# Patient Record
Sex: FEMALE | Race: WHITE | Employment: OTHER | URBAN - METROPOLITAN AREA
[De-identification: names, ages, dates, MRNs, and addresses within clinical notes are randomized per-mention and may not be internally consistent; named-entity substitution may affect disease eponyms.]

---

## 2020-04-04 ENCOUNTER — HOSPITAL ENCOUNTER (INPATIENT)
Age: 70
LOS: 4 days | Discharge: HOME | DRG: 481 | End: 2020-04-08
Attending: EMERGENCY MEDICINE | Admitting: INTERNAL MEDICINE
Payer: MEDICARE

## 2020-04-04 ENCOUNTER — APPOINTMENT (OUTPATIENT)
Dept: GENERAL RADIOLOGY | Age: 70
DRG: 481 | End: 2020-04-04
Attending: PHYSICIAN ASSISTANT
Payer: MEDICARE

## 2020-04-04 DIAGNOSIS — S20.222A BACK CONTUSION, LEFT, INITIAL ENCOUNTER: ICD-10-CM

## 2020-04-04 DIAGNOSIS — S72.002A CLOSED FRACTURE OF LEFT HIP, INITIAL ENCOUNTER (HCC): ICD-10-CM

## 2020-04-04 PROBLEM — E66.9 CLASS 1 OBESITY IN ADULT: Status: ACTIVE | Noted: 2020-04-04

## 2020-04-04 PROBLEM — E87.5 HYPERKALEMIA: Status: ACTIVE | Noted: 2020-04-04

## 2020-04-04 PROBLEM — R73.9 HYPERGLYCEMIA: Status: ACTIVE | Noted: 2020-04-04

## 2020-04-04 PROBLEM — S72.009A HIP FRACTURE (HCC): Status: ACTIVE | Noted: 2020-04-04

## 2020-04-04 PROBLEM — D72.829 LEUKOCYTOSIS: Status: ACTIVE | Noted: 2020-04-04

## 2020-04-04 PROBLEM — S72.92XA FEMUR FRACTURE, LEFT (HCC): Status: ACTIVE | Noted: 2020-04-04

## 2020-04-04 LAB
ALBUMIN SERPL-MCNC: 3.3 G/DL (ref 3.5–5)
ALBUMIN/GLOB SERPL: 0.9 {RATIO} (ref 1.1–2.2)
ALP SERPL-CCNC: 88 U/L (ref 45–117)
ALT SERPL-CCNC: 27 U/L (ref 12–78)
ANION GAP SERPL CALC-SCNC: 9 MMOL/L (ref 5–15)
AST SERPL-CCNC: 42 U/L (ref 15–37)
BASOPHILS # BLD: 0 K/UL (ref 0–0.1)
BASOPHILS NFR BLD: 0 % (ref 0–1)
BILIRUB SERPL-MCNC: 0.9 MG/DL (ref 0.2–1)
BUN SERPL-MCNC: 18 MG/DL (ref 6–20)
BUN/CREAT SERPL: 31 (ref 12–20)
CALCIUM SERPL-MCNC: 9.3 MG/DL (ref 8.5–10.1)
CHLORIDE SERPL-SCNC: 108 MMOL/L (ref 97–108)
CK SERPL-CCNC: 61 U/L (ref 26–192)
CO2 SERPL-SCNC: 17 MMOL/L (ref 21–32)
CREAT SERPL-MCNC: 0.58 MG/DL (ref 0.55–1.02)
DIFFERENTIAL METHOD BLD: ABNORMAL
EOSINOPHIL # BLD: 0 K/UL (ref 0–0.4)
EOSINOPHIL NFR BLD: 0 % (ref 0–7)
ERYTHROCYTE [DISTWIDTH] IN BLOOD BY AUTOMATED COUNT: 12.8 % (ref 11.5–14.5)
GLOBULIN SER CALC-MCNC: 3.5 G/DL (ref 2–4)
GLUCOSE BLD STRIP.AUTO-MCNC: 181 MG/DL (ref 65–100)
GLUCOSE BLD STRIP.AUTO-MCNC: 203 MG/DL (ref 65–100)
GLUCOSE SERPL-MCNC: 209 MG/DL (ref 65–100)
HCT VFR BLD AUTO: 40.6 % (ref 35–47)
HGB BLD-MCNC: 13.6 G/DL (ref 11.5–16)
IMM GRANULOCYTES # BLD AUTO: 0.1 K/UL (ref 0–0.04)
IMM GRANULOCYTES NFR BLD AUTO: 1 % (ref 0–0.5)
LIPASE SERPL-CCNC: <10 U/L (ref 73–393)
LYMPHOCYTES # BLD: 0.7 K/UL (ref 0.8–3.5)
LYMPHOCYTES NFR BLD: 5 % (ref 12–49)
MCH RBC QN AUTO: 31.1 PG (ref 26–34)
MCHC RBC AUTO-ENTMCNC: 33.5 G/DL (ref 30–36.5)
MCV RBC AUTO: 92.7 FL (ref 80–99)
MONOCYTES # BLD: 0.7 K/UL (ref 0–1)
MONOCYTES NFR BLD: 5 % (ref 5–13)
NEUTS SEG # BLD: 12.1 K/UL (ref 1.8–8)
NEUTS SEG NFR BLD: 89 % (ref 32–75)
NRBC # BLD: 0 K/UL (ref 0–0.01)
NRBC BLD-RTO: 0 PER 100 WBC
PLATELET # BLD AUTO: 147 K/UL (ref 150–400)
PMV BLD AUTO: 12.1 FL (ref 8.9–12.9)
POTASSIUM SERPL-SCNC: 3.3 MMOL/L (ref 3.5–5.1)
POTASSIUM SERPL-SCNC: 5.7 MMOL/L (ref 3.5–5.1)
PROT SERPL-MCNC: 6.8 G/DL (ref 6.4–8.2)
RBC # BLD AUTO: 4.38 M/UL (ref 3.8–5.2)
RBC MORPH BLD: ABNORMAL
SERVICE CMNT-IMP: ABNORMAL
SERVICE CMNT-IMP: ABNORMAL
SODIUM SERPL-SCNC: 134 MMOL/L (ref 136–145)
WBC # BLD AUTO: 13.6 K/UL (ref 3.6–11)

## 2020-04-04 PROCEDURE — 93005 ELECTROCARDIOGRAM TRACING: CPT

## 2020-04-04 PROCEDURE — 85025 COMPLETE CBC W/AUTO DIFF WBC: CPT

## 2020-04-04 PROCEDURE — 74011250636 HC RX REV CODE- 250/636: Performed by: INTERNAL MEDICINE

## 2020-04-04 PROCEDURE — 74011250636 HC RX REV CODE- 250/636: Performed by: PHYSICIAN ASSISTANT

## 2020-04-04 PROCEDURE — 96374 THER/PROPH/DIAG INJ IV PUSH: CPT

## 2020-04-04 PROCEDURE — 36415 COLL VENOUS BLD VENIPUNCTURE: CPT

## 2020-04-04 PROCEDURE — 73552 X-RAY EXAM OF FEMUR 2/>: CPT

## 2020-04-04 PROCEDURE — 71045 X-RAY EXAM CHEST 1 VIEW: CPT

## 2020-04-04 PROCEDURE — 99285 EMERGENCY DEPT VISIT HI MDM: CPT

## 2020-04-04 PROCEDURE — 65270000029 HC RM PRIVATE

## 2020-04-04 PROCEDURE — 82962 GLUCOSE BLOOD TEST: CPT

## 2020-04-04 PROCEDURE — 82550 ASSAY OF CK (CPK): CPT

## 2020-04-04 PROCEDURE — 73502 X-RAY EXAM HIP UNI 2-3 VIEWS: CPT

## 2020-04-04 PROCEDURE — 84132 ASSAY OF SERUM POTASSIUM: CPT

## 2020-04-04 PROCEDURE — 80053 COMPREHEN METABOLIC PANEL: CPT

## 2020-04-04 PROCEDURE — 83690 ASSAY OF LIPASE: CPT

## 2020-04-04 RX ORDER — HYDROMORPHONE HYDROCHLORIDE 2 MG/ML
1 INJECTION, SOLUTION INTRAMUSCULAR; INTRAVENOUS; SUBCUTANEOUS
Status: DISCONTINUED | OUTPATIENT
Start: 2020-04-04 | End: 2020-04-05

## 2020-04-04 RX ORDER — DEXTROSE MONOHYDRATE 100 MG/ML
0-250 INJECTION, SOLUTION INTRAVENOUS AS NEEDED
Status: DISCONTINUED | OUTPATIENT
Start: 2020-04-04 | End: 2020-04-08 | Stop reason: HOSPADM

## 2020-04-04 RX ORDER — HYDROMORPHONE HYDROCHLORIDE 1 MG/ML
0.5 INJECTION, SOLUTION INTRAMUSCULAR; INTRAVENOUS; SUBCUTANEOUS
Status: DISCONTINUED | OUTPATIENT
Start: 2020-04-04 | End: 2020-04-04

## 2020-04-04 RX ORDER — ACETAMINOPHEN 325 MG/1
650 TABLET ORAL
Status: DISCONTINUED | OUTPATIENT
Start: 2020-04-04 | End: 2020-04-08 | Stop reason: HOSPADM

## 2020-04-04 RX ORDER — CETIRIZINE HCL 10 MG
5 TABLET ORAL 2 TIMES DAILY
COMMUNITY

## 2020-04-04 RX ORDER — HEPARIN SODIUM 5000 [USP'U]/ML
5000 INJECTION, SOLUTION INTRAVENOUS; SUBCUTANEOUS ONCE
Status: DISCONTINUED | OUTPATIENT
Start: 2020-04-04 | End: 2020-04-04

## 2020-04-04 RX ORDER — OXYCODONE AND ACETAMINOPHEN 5; 325 MG/1; MG/1
1 TABLET ORAL
Status: DISCONTINUED | OUTPATIENT
Start: 2020-04-04 | End: 2020-04-07

## 2020-04-04 RX ORDER — ENOXAPARIN SODIUM 100 MG/ML
40 INJECTION SUBCUTANEOUS EVERY 24 HOURS
Status: DISCONTINUED | OUTPATIENT
Start: 2020-04-04 | End: 2020-04-08 | Stop reason: HOSPADM

## 2020-04-04 RX ORDER — INSULIN LISPRO 100 [IU]/ML
INJECTION, SOLUTION INTRAVENOUS; SUBCUTANEOUS
Status: DISCONTINUED | OUTPATIENT
Start: 2020-04-04 | End: 2020-04-06

## 2020-04-04 RX ORDER — SODIUM CHLORIDE 0.9 % (FLUSH) 0.9 %
5-40 SYRINGE (ML) INJECTION EVERY 8 HOURS
Status: DISCONTINUED | OUTPATIENT
Start: 2020-04-04 | End: 2020-04-08 | Stop reason: HOSPADM

## 2020-04-04 RX ORDER — ONDANSETRON 2 MG/ML
4 INJECTION INTRAMUSCULAR; INTRAVENOUS
Status: COMPLETED | OUTPATIENT
Start: 2020-04-04 | End: 2020-04-04

## 2020-04-04 RX ORDER — ASPIRIN 81 MG/1
81 TABLET ORAL DAILY
COMMUNITY
End: 2020-04-08

## 2020-04-04 RX ORDER — DOCUSATE SODIUM 100 MG/1
100 CAPSULE, LIQUID FILLED ORAL
COMMUNITY

## 2020-04-04 RX ORDER — MAGNESIUM SULFATE 100 %
4 CRYSTALS MISCELLANEOUS AS NEEDED
Status: DISCONTINUED | OUTPATIENT
Start: 2020-04-04 | End: 2020-04-08 | Stop reason: HOSPADM

## 2020-04-04 RX ORDER — SODIUM CHLORIDE 0.9 % (FLUSH) 0.9 %
5-40 SYRINGE (ML) INJECTION AS NEEDED
Status: DISCONTINUED | OUTPATIENT
Start: 2020-04-04 | End: 2020-04-08 | Stop reason: HOSPADM

## 2020-04-04 RX ORDER — SODIUM CHLORIDE 9 MG/ML
75 INJECTION, SOLUTION INTRAVENOUS CONTINUOUS
Status: DISCONTINUED | OUTPATIENT
Start: 2020-04-04 | End: 2020-04-05

## 2020-04-04 RX ORDER — PROCHLORPERAZINE EDISYLATE 5 MG/ML
10 INJECTION INTRAMUSCULAR; INTRAVENOUS
Status: DISCONTINUED | OUTPATIENT
Start: 2020-04-04 | End: 2020-04-08 | Stop reason: HOSPADM

## 2020-04-04 RX ADMIN — ONDANSETRON 4 MG: 2 INJECTION INTRAMUSCULAR; INTRAVENOUS at 17:42

## 2020-04-04 RX ADMIN — SODIUM CHLORIDE 75 ML/HR: 900 INJECTION, SOLUTION INTRAVENOUS at 19:40

## 2020-04-04 RX ADMIN — HYDROMORPHONE HYDROCHLORIDE 0.5 MG: 1 INJECTION, SOLUTION INTRAMUSCULAR; INTRAVENOUS; SUBCUTANEOUS at 19:41

## 2020-04-04 RX ADMIN — Medication 10 ML: at 21:03

## 2020-04-04 RX ADMIN — HYDROMORPHONE HYDROCHLORIDE 1 MG: 2 INJECTION, SOLUTION INTRAMUSCULAR; INTRAVENOUS; SUBCUTANEOUS at 23:07

## 2020-04-04 RX ADMIN — ENOXAPARIN SODIUM 40 MG: 40 INJECTION SUBCUTANEOUS at 21:02

## 2020-04-04 RX ADMIN — PROCHLORPERAZINE EDISYLATE 10 MG: 5 INJECTION INTRAMUSCULAR; INTRAVENOUS at 23:49

## 2020-04-04 NOTE — ED TRIAGE NOTES
Patient arrives EMS from home, had a GLF onto her left side. Reports left femur pain. Left foot noted to be shortened and rotated out 50mg fentanyl given en route. Did not hit her head, denies blood thinner use.

## 2020-04-04 NOTE — H&P
[place] [address]  [phone]    Admission History and Physical      NAME:  Kelsea Phillips   :   1950   MRN:  [id number]     PCP:  Colin Weber MD     Date/Time:  2020         Subjective:     CHIEF COMPLAINT: pain     HISTORY OF PRESENT ILLNESS:     Ms. Tia Masters is a 65 y.o.  female who is admitted with hip fracture. Ms. Tia Masters presented to the Emergency Department this PM complaining of pain: left hip, after GLF, landed on left hip, pain is severe at times, worse with movement, has been unable to bear weight on her leg since she fell. In the ED, she was found to have a left intertrochanteric fracture    History obtained from chart review and the patient. Previous records reviewed: ED visit  with fall and radius fracture    History reviewed. No pertinent past medical history. Denies any chronic medical problems, on no prescription medications    History reviewed. No pertinent surgical history. Social History     Tobacco Use    Smoking status: Never Smoker    Smokeless tobacco: Never Used   Substance Use Topics    Alcohol use: Yes     Alcohol/week: 1.0 standard drinks     Types: 1 Glasses of wine per week        Family History   Problem Relation Age of Onset    Colon Cancer Mother         No Known Allergies     Prior to Admission medications    Medication Sig Start Date End Date Taking? Authorizing Provider   docusate sodium (COLACE) 100 mg capsule Take 100 mg by mouth two (2) times daily as needed for Constipation. Yes Provider, Historical   multivitamin, tx-iron-ca-min (THERA-M w/ IRON) 9 mg iron-400 mcg tab tablet Take 1 Tab by mouth daily. Yes Provider, Historical   cetirizine (ZyrTEC) 10 mg tablet Take 5 mg by mouth two (2) times a day. Yes Provider, Historical   aspirin delayed-release 81 mg tablet Take 81 mg by mouth daily.    Yes Provider, Historical   OTHER,NON-FORMULARY, Fiber pill daily Yes Provider, Historical         Review of Systems:  (bold if positive, if negative)    Gen:  Eyes:  ENT:  CVS:  Pulm:  GI:    :    MS:  PainSkin:  Psych:  Endo:    Hem:  Renal:    Neuro:            Objective:      VITALS:    Vital signs reviewed; most recent are:    Visit Vitals  /71   Pulse 84   Temp 98.6 °F (37 °C)   Resp 18   Ht 5' 10\" (1.778 m)   Wt 99.8 kg (220 lb)   SpO2 96%   BMI 31.57 kg/m²     SpO2 Readings from Last 6 Encounters:   04/04/20 96%   09/19/11 98%        No intake or output data in the 24 hours ending 04/1950         Exam:     Physical Exam:    Gen: Well-developed, obese, elderly, in no acute distress  HEENT:  Pink conjunctivae, PERRL, hearing intact to voice, moist mucous membranes  Neck: Supple, without masses, thyroid non-tender  Resp: No accessory muscle use, clear breath sounds without wheezes rales or rhonchi  Card: No murmurs, normal S1, S2 without thrills, bruits or peripheral edema, 2+ LE peripheral pulses  Abd:  Soft, non-tender, non-distended, normoactive bowel sounds are present, no palpable organomegaly and no detectable hernias  Lymph:  No cervical or inguinal adenopathy  Musc: No cyanosis or clubbing  Skin: No rashes or ulcers, skin turgor is good, cap refill <2 sec  Neuro:  Cranial nerves are grossly intact, no focal motor weakness, follows commands appropriately  Psych:  Good insight, oriented to person, place and time, alert             Labs:    Recent Labs     04/04/20  1744   WBC 13.6*   HGB 13.6   HCT 40.6   *     Recent Labs     04/04/20  1853 04/04/20  1744   NA  --  134*   K 3.3* 5.7*   CL  --  108   CO2  --  17*   GLU  --  209*   BUN  --  18   CREA  --  0.58   CA  --  9.3   ALB  --  3.3*   TBILI  --  0.9   SGOT  --  42*   ALT  --  27     Lab Results   Component Value Date/Time    Glucose (POC) 203 (H) 04/04/2020 05:43 PM     No results for input(s): PH, PCO2, PO2, HCO3, FIO2 in the last 72 hours.   No results for input(s): INR, INREXT, INREXT in the last 72 hours.     Additional testing:  Plain films with left intertrochanteric fracture, visualized by me        Assessment/Plan:       Principal Problem:    Hip fracture ([address] (4/4/2020)   - pain control, DVT prophylaxis with enoxaparin   - Orthopedics to evaluate, likely surgery in AM to repair fracture   - she has no cardiac history, no symptoms, is able to do >4 METs of activity, EKG is normal; she has no acute medical issues which should delay surgery    Active Problems:    Leukocytosis (4/4/2020)   - no infection symptoms, CXR clear, waiting on UA currently   - may be due to acute stress reaction   - recheck in AM      Hyperkalemia (4/4/2020)   - repeat was normal   - check CK to make sure she doesn't have significant rhabdomyolysis   - EKG does not show any T wave changes      Hyperglycemia (4/4/2020)   - denies history of DM, not on medications, states she has not let her PCP ever check blood work, does not go to see him very often   - may be in part due to acute stress reaction but with weight is at risk for DM   - check A1c   - given BS is over 200, will order SSI      Class 1 obesity in adult (4/4/2020)   - counseled on weight loss         Code status:   - patient is FULL CODE, no AMD on file,  Wilmar Jourdan is surrogate      Total time spent on patient care: [address] discussed with: Patient, Nursing Staff and JANES Urbano    Discussed:  Code Status, Care Plan and D/C Planning    Prophylaxis:  Lovenox and SCD's    Probable Disposition:  TBD after surgery, may need SNF for rehab           ___________________________________________________    Attending Physician: Freada Cranker, MD

## 2020-04-04 NOTE — PROGRESS NOTES
Admission Medication Reconciliation:     Information obtained from:    Zee Rosales with the patient's spouse via phone call. RxQuery data available¹:  No    Comments/Recommendations:   Updated PTA medication list  Verified preferred pharmacy  Reviewed patient's allergies     ¹RxQuery pharmacy benefit data reflects medications filled and processed through the patient's insurance, however   this data does NOT capture whether the medication was picked up or is currently being taken by the patient. Prior to Admission Medications   Prescriptions Last Dose Informant Taking? OTHER,NON-FORMULARY,  Significant Other Yes   Sig: Fiber pill daily   aspirin delayed-release 81 mg tablet 4/4/2020 at Unknown time Significant Other Yes   Sig: Take 81 mg by mouth daily. cetirizine (ZyrTEC) 10 mg tablet 4/4/2020 at am Significant Other Yes   Sig: Take 5 mg by mouth two (2) times a day. docusate sodium (COLACE) 100 mg capsule  Significant Other Yes   Sig: Take 100 mg by mouth two (2) times daily as needed for Constipation. multivitamin, tx-iron-ca-min (THERA-M w/ IRON) 9 mg iron-400 mcg tab tablet 4/4/2020 at Unknown time Significant Other Yes   Sig: Take 1 Tab by mouth daily. Facility-Administered Medications: None         Please contact the main inpatient pharmacy with any questions or concerns at [phone] and we will direct you to the clinical pharmacist covering this patient's care while in-house.    Ml Ambriz, TawanaD, BCPS

## 2020-04-04 NOTE — ED NOTES
TRANSFER - OUT REPORT:    Verbal report given to Tameka(name) on Daryle Capri  being transferred to 4th floor(unit) for routine progression of care       Report consisted of patients Situation, Background, Assessment and   Recommendations(SBAR). Information from the following report(s) SBAR, ED Summary and MAR was reviewed with the receiving nurse. Lines:   Peripheral IV 04/04/20 Right Hand (Active)   Site Assessment Clean, dry, & intact 4/4/2020  5:41 PM   Phlebitis Assessment 0 4/4/2020  5:41 PM   Infiltration Assessment 0 4/4/2020  5:41 PM   Dressing Status Clean, dry, & intact 4/4/2020  5:41 PM   Dressing Type Transparent 4/4/2020  5:41 PM   Hub Color/Line Status Pink 4/4/2020  5:41 PM        Opportunity for questions and clarification was provided.       Patient transported with:   [employer]

## 2020-04-04 NOTE — ED PROVIDER NOTES
63yo female presents via EMS for c/o left hip pain after trip and fall. She states she was walking at home, tripped on her shoes and fell onto her left hip. She denies head injury, LOC. She admits to hitting her L upper back on a door stopper. This happened at 12pm today. She has been non-weight beraing since,  helped her get onto the couch and EMS was eventually called due to inability to ambulate. Denies head trauma, neck/midline back pain. She vomited once en route to ER after IV Fentanyl 50mcg given en route, by EMS. No zofran given. She is only c/o left hip pain on arrival. She does not take a blood thinner. Glucose 127. Denies CP, SOB, weakness, recent fever or illness, sick contacts, cough. Takes a 81mg ASA daily. Ortho: Ananda Ojeda  PCP: Dianna Lerner             No past medical history on file. No past surgical history on file. No family history on file. Social History     Socioeconomic History    Marital status:      Spouse name: Not on file    Number of children: Not on file    Years of education: Not on file    Highest education level: Not on file   Occupational History    Not on file   Social Needs    Financial resource strain: Not on file    Food insecurity     Worry: Not on file     Inability: Not on file    Transportation needs     Medical: Not on file     Non-medical: Not on file   Tobacco Use    Smoking status: Never Smoker    Smokeless tobacco: Never Used   Substance and Sexual Activity    Alcohol use:  Yes     Alcohol/week: 1.0 standard drinks     Types: 1 Glasses of wine per week    Drug use: Not on file    Sexual activity: Not on file   Lifestyle    Physical activity     Days per week: Not on file     Minutes per session: Not on file    Stress: Not on file   Relationships    Social connections     Talks on phone: Not on file     Gets together: Not on file     Attends Alevism service: Not on file     Active member of club or organization: Not on file     Attends meetings of clubs or organizations: Not on file     Relationship status: Not on file    Intimate partner violence     Fear of current or ex partner: Not on file     Emotionally abused: Not on file     Physically abused: Not on file     Forced sexual activity: Not on file   Other Topics Concern    Not on file   Social History Narrative    Not on file         ALLERGIES: Patient has no known allergies. Review of Systems   Constitutional: Negative. Negative for activity change, chills, fatigue and unexpected weight change. HENT: Negative for trouble swallowing. Respiratory: Negative for cough, chest tightness, shortness of breath and wheezing. Cardiovascular: Negative. Negative for chest pain and palpitations. Gastrointestinal: Negative. Negative for abdominal pain, diarrhea, nausea and vomiting. Genitourinary: Negative. Negative for dysuria, flank pain, frequency and hematuria. Musculoskeletal: Positive for arthralgias, back pain and gait problem. Negative for neck pain and neck stiffness. Skin: Negative. Negative for color change and rash. Neurological: Negative for dizziness, numbness and headaches. All other systems reviewed and are negative. Vitals:    04/04/20 1710   BP: (!) 187/101   Pulse: 84   Resp: 18   Temp: 98.5 °F (36.9 °C)   SpO2: 94%   Weight: 99.8 kg (220 lb)   Height: 5' 10\" (1.778 m)            Physical Exam  Vitals signs and nursing note reviewed. Constitutional:       General: She is not in acute distress. Appearance: She is well-developed. She is not toxic-appearing or diaphoretic. HENT:      Head: Normocephalic and atraumatic. Eyes:      General:         Right eye: No discharge. Left eye: No discharge. Conjunctiva/sclera: Conjunctivae normal.      Pupils: Pupils are equal, round, and reactive to light. Neck:      Musculoskeletal: Full passive range of motion without pain and normal range of motion.       Trachea: No tracheal tenderness. Cardiovascular:      Rate and Rhythm: Normal rate and regular rhythm. Pulses: Normal pulses. Heart sounds: Normal heart sounds. No murmur. No friction rub. No gallop. Pulmonary:      Effort: Pulmonary effort is normal. No respiratory distress. Breath sounds: Normal breath sounds. No wheezing or rales. Chest:      Chest wall: No tenderness. Abdominal:      General: Bowel sounds are normal. There is no distension. Palpations: Abdomen is soft. Tenderness: There is no abdominal tenderness. There is no guarding or rebound. Musculoskeletal: Normal range of motion. General: Tenderness (TTP of L hip, L upper thigh; L leg shortened and externally rotated. Distal pulses 2+. NVI throughout.) present. Back:    Skin:     General: Skin is warm and dry. Capillary Refill: Capillary refill takes less than 2 seconds. Findings: No abrasion, erythema or rash. Neurological:      Mental Status: She is alert and oriented to person, place, and time. Cranial Nerves: No cranial nerve deficit. Sensory: No sensory deficit. Coordination: Coordination normal.   Psychiatric:         Speech: Speech normal.         Behavior: Behavior normal.          MDM  Number of Diagnoses or Management Options  Back contusion, left, initial encounter:   Closed fracture of left hip, initial encounter [facility]):   Diagnosis management comments:   Ddx: fracture       Amount and/or Complexity of Data Reviewed  Review and summarize past medical records: yes  Discuss the patient with other providers: yes    Patient Progress  Patient progress: stable         Procedures      I discussed patient's PMH, exam findings as well as careplan with the ER attending who agrees with care plan.   Luciana Carlos PA-C    CONSULT NOTE:   6:29 PM  Luciana Carlos PA-C spoke with Dr. Paco Clark, JUAN CARLOS Stafford,   Specialty: ortho  Discussed pt's hx, disposition, and available diagnostic and imaging results. Reviewed care plans. Consultant agrees with plans as outlined. He recommends admission, will take to OR tomorrow, asks for medical clearance for surgery. Written by Dotty Lee PA-C. Hospitalist Kristen Serve for Admission  6:33 PM  ED Room Number: ER08/08  Patient Name and age:  Daryle Capri 65 y.o.  female  Working Diagnosis:   1. Closed fracture of left hip, initial encounter ([facility] [address]    2. Back contusion, left, initial encounter      COVID-19 Suspicion:  no  Readmission: no  Isolation Requirements:  no  Recommended Level of Care:  med/surg  Code Status:  Full Code  Department:[facility] ED - [phone]  Other:  Spoke with DR. Holly Kincaid, on call for [facility], plans to take to OR tomorrow if medically cleared.

## 2020-04-05 ENCOUNTER — APPOINTMENT (OUTPATIENT)
Dept: GENERAL RADIOLOGY | Age: 70
DRG: 481 | End: 2020-04-05
Attending: ORTHOPAEDIC SURGERY
Payer: MEDICARE

## 2020-04-05 ENCOUNTER — ANESTHESIA EVENT (OUTPATIENT)
Dept: SURGERY | Age: 70
DRG: 481 | End: 2020-04-05
Payer: MEDICARE

## 2020-04-05 ENCOUNTER — ANESTHESIA (OUTPATIENT)
Dept: SURGERY | Age: 70
DRG: 481 | End: 2020-04-05
Payer: MEDICARE

## 2020-04-05 LAB
ABO + RH BLD: NORMAL
ANION GAP SERPL CALC-SCNC: 6 MMOL/L (ref 5–15)
APPEARANCE UR: CLEAR
BACTERIA URNS QL MICRO: NEGATIVE /HPF
BILIRUB UR QL: NEGATIVE
BLOOD GROUP ANTIBODIES SERPL: NORMAL
BUN SERPL-MCNC: 22 MG/DL (ref 6–20)
BUN/CREAT SERPL: 42 (ref 12–20)
CALCIUM SERPL-MCNC: 9.1 MG/DL (ref 8.5–10.1)
CHLORIDE SERPL-SCNC: 106 MMOL/L (ref 97–108)
CO2 SERPL-SCNC: 26 MMOL/L (ref 21–32)
COLOR UR: ABNORMAL
COMMENT, HOLDF: NORMAL
COMMENT, HOLDF: NORMAL
CREAT SERPL-MCNC: 0.53 MG/DL (ref 0.55–1.02)
EPITH CASTS URNS QL MICRO: ABNORMAL /LPF
ERYTHROCYTE [DISTWIDTH] IN BLOOD BY AUTOMATED COUNT: 12.7 % (ref 11.5–14.5)
EST. AVERAGE GLUCOSE BLD GHB EST-MCNC: 117 MG/DL
GLUCOSE BLD STRIP.AUTO-MCNC: 153 MG/DL (ref 65–100)
GLUCOSE BLD STRIP.AUTO-MCNC: 154 MG/DL (ref 65–100)
GLUCOSE BLD STRIP.AUTO-MCNC: 173 MG/DL (ref 65–100)
GLUCOSE BLD STRIP.AUTO-MCNC: 181 MG/DL (ref 65–100)
GLUCOSE SERPL-MCNC: 165 MG/DL (ref 65–100)
GLUCOSE UR STRIP.AUTO-MCNC: NEGATIVE MG/DL
HBA1C MFR BLD: 5.7 % (ref 4–5.6)
HCT VFR BLD AUTO: 36.8 % (ref 35–47)
HGB BLD-MCNC: 12.2 G/DL (ref 11.5–16)
HGB UR QL STRIP: NEGATIVE
KETONES UR QL STRIP.AUTO: 15 MG/DL
LEUKOCYTE ESTERASE UR QL STRIP.AUTO: NEGATIVE
MAGNESIUM SERPL-MCNC: 2.2 MG/DL (ref 1.6–2.4)
MCH RBC QN AUTO: 30.7 PG (ref 26–34)
MCHC RBC AUTO-ENTMCNC: 33.2 G/DL (ref 30–36.5)
MCV RBC AUTO: 92.7 FL (ref 80–99)
NITRITE UR QL STRIP.AUTO: NEGATIVE
NRBC # BLD: 0 K/UL (ref 0–0.01)
NRBC BLD-RTO: 0 PER 100 WBC
PH UR STRIP: 5.5 [PH] (ref 5–8)
PHOSPHATE SERPL-MCNC: 4.9 MG/DL (ref 2.6–4.7)
PLATELET # BLD AUTO: 137 K/UL (ref 150–400)
PMV BLD AUTO: 11.4 FL (ref 8.9–12.9)
POTASSIUM SERPL-SCNC: 3.9 MMOL/L (ref 3.5–5.1)
PROT UR STRIP-MCNC: NEGATIVE MG/DL
RBC # BLD AUTO: 3.97 M/UL (ref 3.8–5.2)
RBC #/AREA URNS HPF: ABNORMAL /HPF (ref 0–5)
SAMPLES BEING HELD,HOLD: NORMAL
SAMPLES BEING HELD,HOLD: NORMAL
SERVICE CMNT-IMP: ABNORMAL
SODIUM SERPL-SCNC: 138 MMOL/L (ref 136–145)
SP GR UR REFRACTOMETRY: 1.03 (ref 1–1.03)
SPECIMEN EXP DATE BLD: NORMAL
UA: UC IF INDICATED,UAUC: ABNORMAL
UROBILINOGEN UR QL STRIP.AUTO: 0.2 EU/DL (ref 0.2–1)
WBC # BLD AUTO: 8.7 K/UL (ref 3.6–11)
WBC URNS QL MICRO: ABNORMAL /HPF (ref 0–4)

## 2020-04-05 PROCEDURE — 72170 X-RAY EXAM OF PELVIS: CPT

## 2020-04-05 PROCEDURE — 77030013079 HC BLNKT BAIR HGGR 3M -A: Performed by: ANESTHESIOLOGY

## 2020-04-05 PROCEDURE — 77030002933 HC SUT MCRYL J&J -A: Performed by: ORTHOPAEDIC SURGERY

## 2020-04-05 PROCEDURE — 74011000250 HC RX REV CODE- 250: Performed by: ORTHOPAEDIC SURGERY

## 2020-04-05 PROCEDURE — 77030026438 HC STYL ET INTUB CARD -A: Performed by: ANESTHESIOLOGY

## 2020-04-05 PROCEDURE — 84100 ASSAY OF PHOSPHORUS: CPT

## 2020-04-05 PROCEDURE — 76210000006 HC OR PH I REC 0.5 TO 1 HR: Performed by: ORTHOPAEDIC SURGERY

## 2020-04-05 PROCEDURE — 74011000250 HC RX REV CODE- 250: Performed by: NURSE ANESTHETIST, CERTIFIED REGISTERED

## 2020-04-05 PROCEDURE — 76010000149 HC OR TIME 1 TO 1.5 HR: Performed by: ORTHOPAEDIC SURGERY

## 2020-04-05 PROCEDURE — 77030019908 HC STETH ESOPH SIMS -A: Performed by: ANESTHESIOLOGY

## 2020-04-05 PROCEDURE — 77030011640 HC PAD GRND REM COVD -A: Performed by: ORTHOPAEDIC SURGERY

## 2020-04-05 PROCEDURE — 83036 HEMOGLOBIN GLYCOSYLATED A1C: CPT

## 2020-04-05 PROCEDURE — 74011000258 HC RX REV CODE- 258: Performed by: NURSE ANESTHETIST, CERTIFIED REGISTERED

## 2020-04-05 PROCEDURE — 36415 COLL VENOUS BLD VENIPUNCTURE: CPT

## 2020-04-05 PROCEDURE — 77030031139 HC SUT VCRL2 J&J -A: Performed by: ORTHOPAEDIC SURGERY

## 2020-04-05 PROCEDURE — 86900 BLOOD TYPING SEROLOGIC ABO: CPT

## 2020-04-05 PROCEDURE — 77030016474 HC BIT DRL QC3 SYNT -C: Performed by: ORTHOPAEDIC SURGERY

## 2020-04-05 PROCEDURE — 82962 GLUCOSE BLOOD TEST: CPT

## 2020-04-05 PROCEDURE — 74011250636 HC RX REV CODE- 250/636: Performed by: ORTHOPAEDIC SURGERY

## 2020-04-05 PROCEDURE — 85027 COMPLETE CBC AUTOMATED: CPT

## 2020-04-05 PROCEDURE — 74011250636 HC RX REV CODE- 250/636: Performed by: NURSE ANESTHETIST, CERTIFIED REGISTERED

## 2020-04-05 PROCEDURE — 77030036660

## 2020-04-05 PROCEDURE — 65270000029 HC RM PRIVATE

## 2020-04-05 PROCEDURE — 74011250636 HC RX REV CODE- 250/636: Performed by: INTERNAL MEDICINE

## 2020-04-05 PROCEDURE — 51798 US URINE CAPACITY MEASURE: CPT

## 2020-04-05 PROCEDURE — 83735 ASSAY OF MAGNESIUM: CPT

## 2020-04-05 PROCEDURE — 77030018836 HC SOL IRR NACL ICUM -A: Performed by: ORTHOPAEDIC SURGERY

## 2020-04-05 PROCEDURE — 77030014405 HC GD ROD RMR SYNT -C: Performed by: ORTHOPAEDIC SURGERY

## 2020-04-05 PROCEDURE — 74011250636 HC RX REV CODE- 250/636: Performed by: NURSE PRACTITIONER

## 2020-04-05 PROCEDURE — 74011250637 HC RX REV CODE- 250/637: Performed by: ORTHOPAEDIC SURGERY

## 2020-04-05 PROCEDURE — 81001 URINALYSIS AUTO W/SCOPE: CPT

## 2020-04-05 PROCEDURE — C1713 ANCHOR/SCREW BN/BN,TIS/BN: HCPCS | Performed by: ORTHOPAEDIC SURGERY

## 2020-04-05 PROCEDURE — 77030040361 HC SLV COMPR DVT MDII -B

## 2020-04-05 PROCEDURE — C1769 GUIDE WIRE: HCPCS | Performed by: ORTHOPAEDIC SURGERY

## 2020-04-05 PROCEDURE — 80048 BASIC METABOLIC PNL TOTAL CA: CPT

## 2020-04-05 PROCEDURE — 77030008684 HC TU ET CUF COVD -B: Performed by: ANESTHESIOLOGY

## 2020-04-05 PROCEDURE — 74011000250 HC RX REV CODE- 250: Performed by: NURSE PRACTITIONER

## 2020-04-05 PROCEDURE — 77030020061 HC IV BLD WRMR ADMIN SET 3M -B: Performed by: ANESTHESIOLOGY

## 2020-04-05 PROCEDURE — 76060000033 HC ANESTHESIA 1 TO 1.5 HR: Performed by: ORTHOPAEDIC SURGERY

## 2020-04-05 PROCEDURE — 0QS736Z REPOSITION LEFT UPPER FEMUR WITH INTRAMEDULLARY INTERNAL FIXATION DEVICE, PERCUTANEOUS APPROACH: ICD-10-PCS | Performed by: ORTHOPAEDIC SURGERY

## 2020-04-05 DEVICE — 11MM/125 DEG TI CANN TFNA 235MM/LEFT - STERILE
Type: IMPLANTABLE DEVICE | Site: HIP | Status: FUNCTIONAL
Brand: TFN-ADVANCE

## 2020-04-05 DEVICE — IMPLANTABLE DEVICE: Type: IMPLANTABLE DEVICE | Site: HIP | Status: FUNCTIONAL

## 2020-04-05 DEVICE — SCREW BNE L36MM DIA5MM NONSTERILE TIB LT GRN TI ST CANN LOK: Type: IMPLANTABLE DEVICE | Site: HIP | Status: FUNCTIONAL

## 2020-04-05 RX ORDER — FENTANYL CITRATE 50 UG/ML
INJECTION, SOLUTION INTRAMUSCULAR; INTRAVENOUS AS NEEDED
Status: DISCONTINUED | OUTPATIENT
Start: 2020-04-05 | End: 2020-04-05 | Stop reason: HOSPADM

## 2020-04-05 RX ORDER — SUCCINYLCHOLINE CHLORIDE 20 MG/ML
INJECTION INTRAMUSCULAR; INTRAVENOUS AS NEEDED
Status: DISCONTINUED | OUTPATIENT
Start: 2020-04-05 | End: 2020-04-05 | Stop reason: HOSPADM

## 2020-04-05 RX ORDER — SODIUM CHLORIDE 0.9 % (FLUSH) 0.9 %
5-40 SYRINGE (ML) INJECTION AS NEEDED
Status: DISCONTINUED | OUTPATIENT
Start: 2020-04-05 | End: 2020-04-05 | Stop reason: HOSPADM

## 2020-04-05 RX ORDER — EPHEDRINE SULFATE/0.9% NACL/PF 50 MG/5 ML
SYRINGE (ML) INTRAVENOUS AS NEEDED
Status: DISCONTINUED | OUTPATIENT
Start: 2020-04-05 | End: 2020-04-05 | Stop reason: HOSPADM

## 2020-04-05 RX ORDER — ONDANSETRON 2 MG/ML
4 INJECTION INTRAMUSCULAR; INTRAVENOUS AS NEEDED
Status: DISCONTINUED | OUTPATIENT
Start: 2020-04-05 | End: 2020-04-05 | Stop reason: HOSPADM

## 2020-04-05 RX ORDER — PROPOFOL 10 MG/ML
INJECTION, EMULSION INTRAVENOUS AS NEEDED
Status: DISCONTINUED | OUTPATIENT
Start: 2020-04-05 | End: 2020-04-05 | Stop reason: HOSPADM

## 2020-04-05 RX ORDER — HYDROMORPHONE HYDROCHLORIDE 1 MG/ML
.25-1 INJECTION, SOLUTION INTRAMUSCULAR; INTRAVENOUS; SUBCUTANEOUS
Status: DISCONTINUED | OUTPATIENT
Start: 2020-04-05 | End: 2020-04-05 | Stop reason: HOSPADM

## 2020-04-05 RX ORDER — SODIUM CHLORIDE 0.9 % (FLUSH) 0.9 %
5-40 SYRINGE (ML) INJECTION EVERY 8 HOURS
Status: DISCONTINUED | OUTPATIENT
Start: 2020-04-05 | End: 2020-04-08 | Stop reason: HOSPADM

## 2020-04-05 RX ORDER — FACIAL-BODY WIPES
10 EACH TOPICAL DAILY PRN
Status: DISCONTINUED | OUTPATIENT
Start: 2020-04-07 | End: 2020-04-08 | Stop reason: HOSPADM

## 2020-04-05 RX ORDER — FENTANYL CITRATE 50 UG/ML
25 INJECTION, SOLUTION INTRAMUSCULAR; INTRAVENOUS
Status: DISCONTINUED | OUTPATIENT
Start: 2020-04-05 | End: 2020-04-05 | Stop reason: HOSPADM

## 2020-04-05 RX ORDER — ROCURONIUM BROMIDE 10 MG/ML
INJECTION, SOLUTION INTRAVENOUS AS NEEDED
Status: DISCONTINUED | OUTPATIENT
Start: 2020-04-05 | End: 2020-04-05 | Stop reason: HOSPADM

## 2020-04-05 RX ORDER — POLYETHYLENE GLYCOL 3350 17 G/17G
17 POWDER, FOR SOLUTION ORAL DAILY
Status: DISCONTINUED | OUTPATIENT
Start: 2020-04-06 | End: 2020-04-08 | Stop reason: HOSPADM

## 2020-04-05 RX ORDER — ALBUTEROL SULFATE 0.83 MG/ML
2.5 SOLUTION RESPIRATORY (INHALATION) AS NEEDED
Status: DISCONTINUED | OUTPATIENT
Start: 2020-04-05 | End: 2020-04-05 | Stop reason: HOSPADM

## 2020-04-05 RX ORDER — ONDANSETRON 2 MG/ML
INJECTION INTRAMUSCULAR; INTRAVENOUS AS NEEDED
Status: DISCONTINUED | OUTPATIENT
Start: 2020-04-05 | End: 2020-04-05 | Stop reason: HOSPADM

## 2020-04-05 RX ORDER — NALOXONE HYDROCHLORIDE 0.4 MG/ML
0.4 INJECTION, SOLUTION INTRAMUSCULAR; INTRAVENOUS; SUBCUTANEOUS AS NEEDED
Status: DISCONTINUED | OUTPATIENT
Start: 2020-04-05 | End: 2020-04-08 | Stop reason: HOSPADM

## 2020-04-05 RX ORDER — SODIUM CHLORIDE 9 MG/ML
125 INJECTION, SOLUTION INTRAVENOUS CONTINUOUS
Status: DISPENSED | OUTPATIENT
Start: 2020-04-05 | End: 2020-04-06

## 2020-04-05 RX ORDER — ACETAMINOPHEN 325 MG/1
650 TABLET ORAL EVERY 6 HOURS
Status: DISCONTINUED | OUTPATIENT
Start: 2020-04-05 | End: 2020-04-08 | Stop reason: HOSPADM

## 2020-04-05 RX ORDER — SODIUM CHLORIDE 0.9 % (FLUSH) 0.9 %
5-40 SYRINGE (ML) INJECTION EVERY 8 HOURS
Status: DISCONTINUED | OUTPATIENT
Start: 2020-04-05 | End: 2020-04-05 | Stop reason: HOSPADM

## 2020-04-05 RX ORDER — FERROUS SULFATE, DRIED 160(50) MG
1 TABLET, EXTENDED RELEASE ORAL
Status: DISCONTINUED | OUTPATIENT
Start: 2020-04-06 | End: 2020-04-08 | Stop reason: HOSPADM

## 2020-04-05 RX ORDER — HYDROMORPHONE HYDROCHLORIDE 2 MG/ML
1 INJECTION, SOLUTION INTRAMUSCULAR; INTRAVENOUS; SUBCUTANEOUS
Status: DISCONTINUED | OUTPATIENT
Start: 2020-04-05 | End: 2020-04-08 | Stop reason: HOSPADM

## 2020-04-05 RX ORDER — DIPHENHYDRAMINE HYDROCHLORIDE 50 MG/ML
12.5 INJECTION, SOLUTION INTRAMUSCULAR; INTRAVENOUS AS NEEDED
Status: DISCONTINUED | OUTPATIENT
Start: 2020-04-05 | End: 2020-04-05 | Stop reason: HOSPADM

## 2020-04-05 RX ORDER — SODIUM CHLORIDE, SODIUM LACTATE, POTASSIUM CHLORIDE, CALCIUM CHLORIDE 600; 310; 30; 20 MG/100ML; MG/100ML; MG/100ML; MG/100ML
125 INJECTION, SOLUTION INTRAVENOUS CONTINUOUS
Status: DISCONTINUED | OUTPATIENT
Start: 2020-04-05 | End: 2020-04-05 | Stop reason: HOSPADM

## 2020-04-05 RX ORDER — SODIUM CHLORIDE, SODIUM LACTATE, POTASSIUM CHLORIDE, CALCIUM CHLORIDE 600; 310; 30; 20 MG/100ML; MG/100ML; MG/100ML; MG/100ML
INJECTION, SOLUTION INTRAVENOUS
Status: DISCONTINUED | OUTPATIENT
Start: 2020-04-05 | End: 2020-04-05 | Stop reason: HOSPADM

## 2020-04-05 RX ORDER — DEXAMETHASONE SODIUM PHOSPHATE 4 MG/ML
INJECTION, SOLUTION INTRA-ARTICULAR; INTRALESIONAL; INTRAMUSCULAR; INTRAVENOUS; SOFT TISSUE AS NEEDED
Status: DISCONTINUED | OUTPATIENT
Start: 2020-04-05 | End: 2020-04-05 | Stop reason: HOSPADM

## 2020-04-05 RX ORDER — LIDOCAINE HYDROCHLORIDE 20 MG/ML
INJECTION, SOLUTION EPIDURAL; INFILTRATION; INTRACAUDAL; PERINEURAL AS NEEDED
Status: DISCONTINUED | OUTPATIENT
Start: 2020-04-05 | End: 2020-04-05 | Stop reason: HOSPADM

## 2020-04-05 RX ORDER — AMOXICILLIN 250 MG
1 CAPSULE ORAL 2 TIMES DAILY
Status: DISCONTINUED | OUTPATIENT
Start: 2020-04-05 | End: 2020-04-08 | Stop reason: HOSPADM

## 2020-04-05 RX ORDER — SODIUM CHLORIDE 0.9 % (FLUSH) 0.9 %
5-40 SYRINGE (ML) INJECTION AS NEEDED
Status: DISCONTINUED | OUTPATIENT
Start: 2020-04-05 | End: 2020-04-08 | Stop reason: HOSPADM

## 2020-04-05 RX ADMIN — PROCHLORPERAZINE EDISYLATE 10 MG: 5 INJECTION INTRAMUSCULAR; INTRAVENOUS at 06:43

## 2020-04-05 RX ADMIN — TRANEXAMIC ACID 1 G: 100 INJECTION, SOLUTION INTRAVENOUS at 10:29

## 2020-04-05 RX ADMIN — SODIUM CHLORIDE 125 ML/HR: 900 INJECTION, SOLUTION INTRAVENOUS at 20:19

## 2020-04-05 RX ADMIN — ONDANSETRON 4 MG: 2 INJECTION INTRAMUSCULAR; INTRAVENOUS at 10:18

## 2020-04-05 RX ADMIN — LIDOCAINE HYDROCHLORIDE 60 MG: 20 INJECTION, SOLUTION INTRAVENOUS at 09:57

## 2020-04-05 RX ADMIN — WATER 2 G: 1 INJECTION INTRAMUSCULAR; INTRAVENOUS; SUBCUTANEOUS at 10:00

## 2020-04-05 RX ADMIN — SENNOSIDES AND DOCUSATE SODIUM 1 TABLET: 8.6; 5 TABLET ORAL at 17:32

## 2020-04-05 RX ADMIN — OXYCODONE HYDROCHLORIDE AND ACETAMINOPHEN 1 TABLET: 5; 325 TABLET ORAL at 17:32

## 2020-04-05 RX ADMIN — FENTANYL CITRATE 50 MCG: 50 INJECTION, SOLUTION INTRAMUSCULAR; INTRAVENOUS at 09:57

## 2020-04-05 RX ADMIN — Medication 10 ML: at 23:41

## 2020-04-05 RX ADMIN — FENTANYL CITRATE 50 MCG: 50 INJECTION, SOLUTION INTRAMUSCULAR; INTRAVENOUS at 10:29

## 2020-04-05 RX ADMIN — PROPOFOL 150 MG: 10 INJECTION, EMULSION INTRAVENOUS at 09:57

## 2020-04-05 RX ADMIN — Medication 10 ML: at 06:23

## 2020-04-05 RX ADMIN — HYDROMORPHONE HYDROCHLORIDE 1 MG: 2 INJECTION, SOLUTION INTRAMUSCULAR; INTRAVENOUS; SUBCUTANEOUS at 03:20

## 2020-04-05 RX ADMIN — ROCURONIUM BROMIDE 5 MG: 50 INJECTION, SOLUTION INTRAVENOUS at 09:57

## 2020-04-05 RX ADMIN — ENOXAPARIN SODIUM 40 MG: 40 INJECTION SUBCUTANEOUS at 20:22

## 2020-04-05 RX ADMIN — ACETAMINOPHEN 650 MG: 325 TABLET ORAL at 14:34

## 2020-04-05 RX ADMIN — ACETAMINOPHEN 650 MG: 325 TABLET ORAL at 23:43

## 2020-04-05 RX ADMIN — WATER 2 G: 1 INJECTION INTRAMUSCULAR; INTRAVENOUS; SUBCUTANEOUS at 16:18

## 2020-04-05 RX ADMIN — SODIUM CHLORIDE 125 ML/HR: 900 INJECTION, SOLUTION INTRAVENOUS at 11:34

## 2020-04-05 RX ADMIN — SODIUM CHLORIDE, POTASSIUM CHLORIDE, SODIUM LACTATE AND CALCIUM CHLORIDE: 600; 310; 30; 20 INJECTION, SOLUTION INTRAVENOUS at 09:51

## 2020-04-05 RX ADMIN — Medication 20 MG: at 10:06

## 2020-04-05 RX ADMIN — HYDROMORPHONE HYDROCHLORIDE 1 MG: 2 INJECTION, SOLUTION INTRAMUSCULAR; INTRAVENOUS; SUBCUTANEOUS at 08:53

## 2020-04-05 RX ADMIN — ACETAMINOPHEN 650 MG: 325 TABLET ORAL at 17:32

## 2020-04-05 RX ADMIN — DEXAMETHASONE SODIUM PHOSPHATE 4 MG: 4 INJECTION, SOLUTION INTRAMUSCULAR; INTRAVENOUS at 10:17

## 2020-04-05 RX ADMIN — SUCCINYLCHOLINE CHLORIDE 100 MG: 20 INJECTION, SOLUTION INTRAMUSCULAR; INTRAVENOUS; PARENTERAL at 09:57

## 2020-04-05 RX ADMIN — HYDROMORPHONE HYDROCHLORIDE 1 MG: 2 INJECTION, SOLUTION INTRAMUSCULAR; INTRAVENOUS; SUBCUTANEOUS at 14:34

## 2020-04-05 NOTE — PROGRESS NOTES
Physical Therapy Note:  Consult recived and chart reviewed. PT order with an 1800 start time.  Will defer and follow up for evaluation tomorrow am.

## 2020-04-05 NOTE — ANESTHESIA POSTPROCEDURE EVALUATION
Procedure(s):  LEFT FEMORAL INTERTROCHANTERIC NAIL INSERTION. general    Anesthesia Post Evaluation      Multimodal analgesia: multimodal analgesia not used between 6 hours prior to anesthesia start to PACU discharge  Patient location during evaluation: PACU  Patient participation: complete - patient participated  Level of consciousness: awake  Pain management: adequate  Airway patency: patent  Anesthetic complications: no  Cardiovascular status: acceptable, blood pressure returned to baseline and hemodynamically stable  Respiratory status: acceptable  Hydration status: acceptable  Post anesthesia nausea and vomiting:  controlled      Vitals Value Taken Time   /63 4/5/2020 11:30 AM   Temp 36.3 °C (97.4 °F) 4/5/2020 11:20 AM   Pulse 85 4/5/2020 11:35 AM   Resp 14 4/5/2020 11:35 AM   SpO2 98 % 4/5/2020 11:35 AM   Vitals shown include unvalidated device data.

## 2020-04-05 NOTE — PROGRESS NOTES
Occupational Therapy Note:  Orders acknowledged, chart reviewed, and spoke with nursing. Patient is POD 0 of L femoral intertrochanteric nailing. Will see POD 1 for OT evaluation as appropriate. Thank you.   Chapin Chavira OTR/CHAPIN

## 2020-04-05 NOTE — PROGRESS NOTES
[facility] [address]  [address]  [phone]      Medical Progress Note      NAME: Catherine Segal   :  1950  MRM:  [id number]    Date/Time: 2020  10:43 AM         Subjective:     Chief Complaint:  \"I'm just tired\"     Pt seen and examined. No complaints. Pain currently controlled. Just feels tired as she didn't sleep much     ROS:  (bold if positive, if negative)             Objective:       Vitals:          Last 24hrs VS reviewed since prior progress note.  Most recent are:    Visit Vitals  /60 (BP 1 Location: Left arm, BP Patient Position: At rest;Sitting)   Pulse 65   Temp 98.6 °F (37 °C)   Resp 16   Ht 5' 10\" (1.778 m)   Wt 99.8 kg (220 lb)   SpO2 97%   Breastfeeding No   BMI 31.57 kg/m²     SpO2 Readings from Last 6 Encounters:   20 97%   11 98%    O2 Flow Rate (L/min): 3 l/min       Intake/Output Summary (Last 24 hours) at 2020 1043  Last data filed at 2020 0453  Gross per 24 hour   Intake 685 ml   Output 500 ml   Net 185 ml          Exam:     Physical Exam:    Gen:  Well-developed, well-nourished, in no acute distress  HEENT:  Pink conjunctivae, PERRL, hearing intact to voice, moist mucous membranes  Neck:  Supple, without masses, thyroid non-tender  Resp:  No accessory muscle use, clear breath sounds without wheezes rales or rhonchi  Card:  No murmurs, normal S1, S2 without thrills, bruits or peripheral edema  Abd:  Soft, non-tender, non-distended, normoactive bowel sounds are present  Musc:  No cyanosis or clubbing  Skin:  No rashes or ulcers, skin turgor is good  Neuro:  Cranial nerves 3-12 are grossly intact,  strength is 5/5 bilaterally and dorsi / plantarflexion is 5/5 bilaterally, follows commands appropriately  Psych:  Good insight, oriented to person, place and time, alert    Medications Reviewed: (see below)    Lab Data Reviewed: (see below)    ______________________________________________________________________    Medications:     Current Facility-Administered Medications   Medication Dose Route Frequency    HYDROmorphone (PF) (DILAUDID) injection 1 mg  1 mg IntraVENous Q3H PRN    0.9% sodium chloride infusion  75 mL/hr IntraVENous CONTINUOUS    sodium chloride (NS) flush 5-40 mL  5-40 mL IntraVENous Q8H    sodium chloride (NS) flush 5-40 mL  5-40 mL IntraVENous PRN    acetaminophen (TYLENOL) tablet 650 mg  650 mg Oral Q4H PRN    oxyCODONE-acetaminophen (PERCOCET) 5-325 mg per tablet 1 Tab  1 Tab Oral Q4H PRN    enoxaparin (LOVENOX) injection 40 mg  40 mg SubCUTAneous Q24H    insulin lispro (HUMALOG) injection   SubCUTAneous AC&HS    glucose chewable tablet 16 g  4 Tab Oral PRN    glucagon (GLUCAGEN) injection 1 mg  1 mg IntraMUSCular PRN    dextrose 10% infusion 0-250 mL  0-250 mL IntraVENous PRN    prochlorperazine (COMPAZINE) injection 10 mg  10 mg IntraVENous Q4H PRN     Facility-Administered Medications Ordered in Other Encounters   Medication Dose Route Frequency    ePHEDrine (PF) (MISTOLE) 10 mg/mL in NS syringe   IntraVENous PRN    fentaNYL citrate (PF) injection   IntraVENous PRN    tranexamic acid (CYKLOKAPRON) 1 g in 0.9% sodium chloride (MBP/ADV) 110 mL MBP    CONTINUOUS    propofoL (DIPRIVAN) 10 mg/mL injection   IntraVENous PRN    rocuronium injection   IntraVENous PRN    lidocaine (PF) (XYLOCAINE) 20 mg/mL (2 %) injection   IntraVENous PRN    succinylcholine (ANECTINE) injection   IntraVENous PRN    ondansetron (ZOFRAN) injection   IntraVENous PRN    lactated Ringers infusion   IntraVENous CONTINUOUS    dexamethasone (DECADRON) 4 mg/mL injection   IntraVENous PRN            Lab Review:     Recent Labs     04/05/20  0325 04/04/20  1744   WBC 8.7 13.6*   HGB 12.2 13.6   HCT 36.8 40.6   * 147*     Recent Labs     04/05/20  0325 04/04/20  1853 04/04/20  1744     --  134*   K 3.9 3.3* 5.7*    --  108   CO2 26  --  17*   *  --  209*   BUN 22*  --  18   CREA 0.53*  --  0.58   CA 9.1  --  9.3   MG 2.2  --   --    PHOS 4.9*  --   --    ALB  --   --  3.3*   SGOT  --   --  42*   ALT  --   --  27     No components found for: [place]         Assessment / Plan:   Hip fracture ([facility] [address] (4/4/2020)  -pain control and DVT prophylaxis as per ortho  -scheduled for OR today   -PT/OT once cleared by ortho   -?d/c to IPR v. Home with home health PT/OT; defer to PT/OT and ortho       Leukocytosis (4/4/2020) - suspect acute stress reaction as UA and CXR WNL and resolved  -monitor      Hyperkalemia - resolved without treatment   -monitor        Hyperglycemia (4/4/2020) - A1c pending.  Suspect undiagnosed DM   -ISS while in house   -if needed start diabetic regimen depending on A1c        Class 1 obesity in adult (4/4/2020)  -counseled on weight loss                    Code status:              - patient is FULL CODE, no AMD on file,  Deanna Smithn is surrogate    Total time spent with patient: Chip discussed with: Patient    Discussed:  Code Status, Care Plan and D/C Planning    Prophylaxis:  SCD's    Disposition:  TBD           ___________________________________________________    Attending Physician: Tara Adams MD

## 2020-04-05 NOTE — CONSULTS
ORTHOPAEDIC FRACTURE CONSULT NOTE    Subjective:     Date of Consultation:  April 4, 2020      Daryle Capri is a 65 y.o. female who is being seen for left hip pain. Pt with hx of hyperkalemia, hyperglycemia. Pt was trying to get her \"steps\" in and ambulating quickly around her home. Her tennis shoes got stuck on the ground and then fell to the ground. She crawled to the couch. She was unable to ambulate after the fall. EMS called / brought to [facility]. Radiographs reveal left IT fracture. No personal history of cancer. Pt lives with her 80 year . Pt does not use a walker or cane to ambulate with. Pt is generally active. Pt denies shortness of breath, chest pain. No fevers/ chills. Patient Active Problem List    Diagnosis Date Noted    Hip fracture ([facility] [address] 04/04/2020    Leukocytosis 04/04/2020    Hyperkalemia 04/04/2020    Hyperglycemia 04/04/2020    Class 1 obesity in adult 04/04/2020    Distal radius fracture 09/19/2011     Family History   Problem Relation Age of Onset    Colon Cancer Mother       Social History     Tobacco Use    Smoking status: Never Smoker    Smokeless tobacco: Never Used   Substance Use Topics    Alcohol use: Yes     Alcohol/week: 1.0 standard drinks     Types: 1 Glasses of wine per week     History reviewed. No pertinent past medical history. History reviewed. No pertinent surgical history. Prior to Admission medications    Medication Sig Start Date End Date Taking? Authorizing Provider   docusate sodium (COLACE) 100 mg capsule Take 100 mg by mouth two (2) times daily as needed for Constipation. Yes Provider, Historical   multivitamin, tx-iron-ca-min (THERA-M w/ IRON) 9 mg iron-400 mcg tab tablet Take 1 Tab by mouth daily. Yes Provider, Historical   cetirizine (ZyrTEC) 10 mg tablet Take 5 mg by mouth two (2) times a day. Yes Provider, Historical   aspirin delayed-release 81 mg tablet Take 81 mg by mouth daily.    Yes Provider, Historical   Denny Olivarez, Fiber pill daily   Yes Provider, Historical     Current Facility-Administered Medications   Medication Dose Route Frequency    0.9% sodium chloride infusion  75 mL/hr IntraVENous CONTINUOUS    sodium chloride (NS) flush 5-40 mL  5-40 mL IntraVENous Q8H    sodium chloride (NS) flush 5-40 mL  5-40 mL IntraVENous PRN    acetaminophen (TYLENOL) tablet 650 mg  650 mg Oral Q4H PRN    oxyCODONE-acetaminophen (PERCOCET) 5-325 mg per tablet 1 Tab  1 Tab Oral Q4H PRN    HYDROmorphone (DILAUDID) syringe 0.5 mg  0.5 mg IntraVENous Q4H PRN    enoxaparin (LOVENOX) injection 40 mg  40 mg SubCUTAneous Q24H    insulin lispro (HUMALOG) injection   SubCUTAneous AC&HS    glucose chewable tablet 16 g  4 Tab Oral PRN    glucagon (GLUCAGEN) injection 1 mg  1 mg IntraMUSCular PRN    dextrose 10% infusion 0-250 mL  0-250 mL IntraVENous PRN      No Known Allergies     Review of Systems:  A comprehensive review of systems was negative except for that written in the HPI. EXAM: XR HIP LT W OR WO PELV 2-3 VWS, XR FEMUR LT 2 V     INDICATION: pain, fall.     COMPARISON: None.     FINDINGS: AP view of the pelvis. AP views of the right hip and left hip. 2 views  of the left femur.     The bones are osteopenic. Calcified uterine fibroids are present. There is a  mildly displaced left intertrochanteric femur fracture. There is mild varus  angulation of the proximal left femur. The lesser trochanter is split from the  main portion of the proximal left femur by 1.8 cm medially. There is preexistent  moderate left hip osteoarthritis.  The right hip is intact.     IMPRESSION  IMPRESSION: Acute mildly displaced left intertrochanteric femur fracture    Objective:     Patient Vitals for the past 8 hrs:   BP Temp Pulse Resp SpO2 Height Weight   04/04/20 2052 (!) 137/95 97.9 °F (36.6 °C) 72  99 %     04/04/20 1930 107/71 98.6 °F (37 °C)   96 %     04/04/20 1915 126/56    97 %     04/04/20 1900 125/66    97 %     04/04/20 1845 134/64    94 %     20 1710 (!) 187/101 98.5 °F (36.9 °C) 84 18 94 % 5' 10\" (1.778 m) 99.8 kg (220 lb)     Temp (24hrs), Av.3 °F (36.8 °C), Min:97.9 °F (36.6 °C), Max:98.6 °F (37 °C)      Gen: Well-developed,  in no acute distress   Musc: Left LE shortened and externally rotated. PP +2. Cap refill less than 3 seconds. Sensation intact throughout. Skin: No skin breakdown noted. Skin warm, pink, dry  Neuro: Cranial nerves are grossly intact, no focal motor weakness, follows commands appropriately   Psych: Good insight, oriented to person, place and time, alert    Imaging Review: EXAM: XR HIP LT W OR WO PELV 2-3 VWS, XR FEMUR LT 2 V     INDICATION: pain, fall.     COMPARISON: None.     FINDINGS: AP view of the pelvis. AP views of the right hip and left hip. 2 views  of the left femur.     The bones are osteopenic. Calcified uterine fibroids are present. There is a  mildly displaced left intertrochanteric femur fracture. There is mild varus  angulation of the proximal left femur. The lesser trochanter is split from the  main portion of the proximal left femur by 1.8 cm medially. There is preexistent  moderate left hip osteoarthritis.  The right hip is intact.     IMPRESSION  IMPRESSION: Acute mildly displaced left intertrochanteric femur fracture    Labs:   Recent Results (from the past 24 hour(s))   GLUCOSE, POC    Collection Time: 20  5:43 PM   Result Value Ref Range    Glucose (POC) 203 (H) 65 - 100 mg/dL    Performed by Jacki Brunner (tech)    CBC WITH AUTOMATED DIFF    Collection Time: 20  5:44 PM   Result Value Ref Range    WBC 13.6 (H) 3.6 - 11.0 K/uL    RBC 4.38 3.80 - 5.20 M/uL    HGB 13.6 11.5 - 16.0 g/dL    HCT 40.6 35.0 - 47.0 %    MCV 92.7 80.0 - 99.0 FL    MCH 31.1 26.0 - 34.0 PG    MCHC 33.5 30.0 - 36.5 g/dL    RDW 12.8 11.5 - 14.5 %    PLATELET 974 (L) 673 - 400 K/uL    MPV 12.1 8.9 - 12.9 FL    NRBC 0.0 0  WBC    ABSOLUTE NRBC 0.00 0.00 - 0.01 K/uL    NEUTROPHILS 89 (H) 32 - 75 %    LYMPHOCYTES 5 (L) 12 - 49 %    MONOCYTES 5 5 - 13 %    EOSINOPHILS 0 0 - 7 %    BASOPHILS 0 0 - 1 %    IMMATURE GRANULOCYTES 1 (H) 0.0 - 0.5 %    ABS. NEUTROPHILS 12.1 (H) 1.8 - 8.0 K/UL    ABS. LYMPHOCYTES 0.7 (L) 0.8 - 3.5 K/UL    ABS. MONOCYTES 0.7 0.0 - 1.0 K/UL    ABS. EOSINOPHILS 0.0 0.0 - 0.4 K/UL    ABS. BASOPHILS 0.0 0.0 - 0.1 K/UL    ABS. IMM. GRANS. 0.1 (H) 0.00 - 0.04 K/UL    DF AUTOMATED      RBC COMMENTS NORMOCYTIC, NORMOCHROMIC     METABOLIC PANEL, COMPREHENSIVE    Collection Time: 04/04/20  5:44 PM   Result Value Ref Range    Sodium 134 (L) 136 - 145 mmol/L    Potassium 5.7 (H) 3.5 - 5.1 mmol/L    Chloride 108 97 - 108 mmol/L    CO2 17 (L) 21 - 32 mmol/L    Anion gap 9 5 - 15 mmol/L    Glucose 209 (H) 65 - 100 mg/dL    BUN 18 6 - 20 MG/DL    Creatinine 0.58 0.55 - 1.02 MG/DL    BUN/Creatinine ratio 31 (H) 12 - 20      GFR est AA >60 >60 ml/min/1.73m2    GFR est non-AA >60 >60 ml/min/1.73m2    Calcium 9.3 8.5 - 10.1 MG/DL    Bilirubin, total 0.9 0.2 - 1.0 MG/DL    ALT (SGPT) 27 12 - 78 U/L    AST (SGOT) 42 (H) 15 - 37 U/L    Alk.  phosphatase 88 45 - 117 U/L    Protein, total 6.8 6.4 - 8.2 g/dL    Albumin 3.3 (L) 3.5 - 5.0 g/dL    Globulin 3.5 2.0 - 4.0 g/dL    A-G Ratio 0.9 (L) 1.1 - 2.2     LIPASE    Collection Time: 04/04/20  5:44 PM   Result Value Ref Range    Lipase <10 (L) 73 - 393 U/L   POTASSIUM    Collection Time: 04/04/20  6:53 PM   Result Value Ref Range    Potassium 3.3 (L) 3.5 - 5.1 mmol/L   CK    Collection Time: 04/04/20  6:53 PM   Result Value Ref Range    CK 61 26 - 192 U/L   EKG, 12 LEAD, INITIAL    Collection Time: 04/04/20  7:39 PM   Result Value Ref Range    Ventricular Rate 94 BPM    Atrial Rate 94 BPM    P-R Interval 148 ms    QRS Duration 76 ms    Q-T Interval 384 ms    QTC Calculation (Bezet) 480 ms    Calculated P Axis 48 degrees    Calculated R Axis 20 degrees    Calculated T Axis 37 degrees    Diagnosis       Normal sinus rhythm  Normal ECG  No previous ECGs available     GLUCOSE, POC    Collection Time: 20  9:00 PM   Result Value Ref Range    Glucose (POC) 181 (H) 65 - 100 mg/dL    Performed by Nick Lopez (PCT)          Impression:     Patient Active Problem List    Diagnosis Date Noted    Hip fracture ([facility] [address] 2020    Leukocytosis 2020    Hyperkalemia 2020    Hyperglycemia 2020    Class 1 obesity in adult 2020    Distal radius fracture 2011     Principal Problem:    Hip fracture ([facility] [address] (2020)    Active Problems:    Leukocytosis (2020)      Hyperkalemia (2020)      Hyperglycemia (2020)      Class 1 obesity in adult (2020)        Plan:   Left intertrochanteric hip fracture  Discussed with patient and family ( on phone) about nature of condition and treatment options. Described in detail the details of  surgery and recovery process. Reviewed risks/ benefits of surgical intervention with hip fracture. Will await medical clearance. Plan for Left hip hemiarthroplasty tomorrow am at 10:00 am by Dr. Marcellus Babinski  Case posted with OR.  NPO after MN. Await UA results. DVT proph with Heparin 5000 units one time dose now. No other anticoagulants. Appreciate medicine admission  Pain control per ER MD  Discussed with Dr. Zackary Schaffer,  he agrees with above plan.        Helen Pineda NP

## 2020-04-05 NOTE — PERIOP NOTES
TRANSFER - OUT REPORT:    Verbal report given to [facility] RN(name) on [facility]  being transferred to room 402(unit) for routine post - op       Report consisted of patients Situation, Background, Assessment and   Recommendations(SBAR). Information from the following report(s) OR Summary, Procedure Summary, Intake/Output and Recent Results was reviewed with the receiving nurse. Lines:   Peripheral IV 04/04/20 Right Hand (Active)   Site Assessment Clean, dry, & intact 4/5/2020 11:46 AM   Phlebitis Assessment 0 4/5/2020 11:46 AM   Infiltration Assessment 0 4/5/2020 11:46 AM   Dressing Status Clean, dry, & intact 4/5/2020 11:46 AM   Dressing Type Tape;Transparent 4/5/2020 11:46 AM   Hub Color/Line Status Pink; Infusing 4/5/2020 11:46 AM   Action Taken Open ports on tubing capped 4/5/2020  4:00 AM   Alcohol Cap Used Yes 4/5/2020 11:46 AM        Opportunity for questions and clarification was provided. Patient transported with:   Registered Nurse Bedside report given to RN. Patient resting comfortably.

## 2020-04-05 NOTE — OP NOTES
OPERATIVE REPORT   Admit Date: 4/4/2020  Admit Diagnosis: Femur fracture, left ([facility] [address] [S72.92XA]  Hip fracture ([facility] [address] [S72.009A]    Date of Procedure: 4/5/2020   Preoperative Diagnosis: LEFT INTERTROCHANTERIC FEMUR FRACTURE  Postoperative Diagnosis: LEFT INTERTROCHANTERIC FEMUR FRACTURE    Procedure: LEFT FEMORAL INTERTROCHANTERIC INTRAMEDULLARY NAILING  Surgeon: Sena Santiago MD  Assistant(s): Hugo Caul  Anesthesia: General   Antibiotics: Ancef 2g  Estimated Blood Loss: 100cc  Specimens: * No specimens in log *   Complications: None        INDICATIONS:     The patient is a 65 y.o.,  with an acute fall and was found to have an intertrochanteric hip fracture. The patient was evaluated by the hospitalist and cleared for surgery. Informed consent including a discussion of the risks and benefits, which include, but are not limited to, bleeding requiring blood transfusion and risks associated to transfusion, infection, neurovascular damage, wound complications, non-union/malunion, leg length inequality and/or malrotation, DVT, the patient consented for the procedure. DESCRIPTION OF PROCEDURE:             The patient underwent the appropriate anesthesia and was taken to the operating room. They were positioned on the fracture table and both extremities were well padded with a well padded post for traction. Under C-arm guidance the fracture was reduced and verified in both AP and lateral planes. The hip was then prepped and drapped in a sterile fashion. Prior to the incision the appropriate time-out was performed and preop antibiotics were given. Utilizing fluoroscopic guidance the start point was established on AP and lateral views with the fracture reduced. After the entry was created a bulb tipped guide-wire was inserted and placement verified on AP and lateral at the hip. The appropriate length and diameter were selected for his anatomy.  The nail was the inserted to the appropriate depth with fluoroscopy guidance. The position was verified on AP and lateral views and then a separate incision was made laterally for lag screw insertion. The cannula inserted and the guide-wire was placed under fluoro to the sub-chondral bone of the femoral head. This was measured and then the appropriate depth drill was used to create an entry hole. The lag screw was inserted and the fracture compressed as needed. A distal interlock was then placed through the guide. Final films were obtained of the hip and knee. The wounds were copiously irrigated. The wounds were irrigated and then closure was performed in layer with 0 Vicryl in fascia and deep tissue, 2-0 Vicryl for the subcutaneous tissue, and 3-0 Monocryl and dermabond on the skin cont. A sterile dressing was then applied. The patient was taken to recovery in a stable condition. IMPLANTS :   Implant Name Type Inv. Item Serial No.  Lot No. LRB No. Used Action   NAIL TAWNY 125D [id number] LT -- TFNA STRL - SN/A  NAIL TAWNY 125D [id number] LT -- TFNA STRL N/A [employer] [place] [id number] Left 1 Implanted   BLADE HELCL FEN 105MM STRL -- TFNA - SN/A  BLADE HELCL FEN 105MM STRL -- TFNA N/A [employer] [place] N/A Left 1 Implanted   SCR BNE LCK T25 F/IM NL [id number] --  - SN/A  SCR BNE LCK T25 F/IM NL [id number] --  N/A [employer] [place] N/A Left 1 Implanted       Aubrey Berry MD

## 2020-04-05 NOTE — ANESTHESIA PREPROCEDURE EVALUATION
Anesthetic History   No history of anesthetic complications            Review of Systems / Medical History  Patient summary reviewed and pertinent labs reviewed    Pulmonary  Within defined limits                 Neuro/Psych   Within defined limits           Cardiovascular  Within defined limits                Exercise tolerance: >4 METS     GI/Hepatic/Renal  Within defined limits              Endo/Other        Obesity     Other Findings   Comments: Hip fracture           Physical Exam    Airway  Mallampati: II      Mouth opening: Normal     Cardiovascular    Rhythm: regular  Rate: normal         Dental    Dentition: Lower dentition intact and Upper dentition intact     Pulmonary  Breath sounds clear to auscultation               Abdominal  GI exam deferred       Other Findings            Anesthetic Plan    ASA: 2  Anesthesia type: general          Induction: Intravenous  Anesthetic plan and risks discussed with: Patient and Spouse      Spoke to  on phone who gave informed consent for GETA since patient was sleepy from being given Dilaudid this morning.

## 2020-04-05 NOTE — CONSULTS
ORTHOPAEDIC FRACTURE CONSULT NOTE    Subjective:     Date of Consultation:  April 5, 2020      Rhonda Guerrero is a 65 y.o. female with left hip pain. Pt with hx of hyperkalemia, hyperglycemia. Pt was trying to exercise in her house when her tennis shoes got stuck on the ground resulting in a ground-level fall. She was unable to ambulate after the fall. She was brought to the emergency room where radiographs revealed a left IT fracture. No personal history of cancer. Denied antecedent hip pain pt lives with her 78 year . Pt does not use a walker or cane to ambulate. Pt is generally active. Pt denies shortness of breath, chest pain. No fevers/ chills. Patient Active Problem List    Diagnosis Date Noted    Hip fracture ([facility] [address] 04/04/2020    Leukocytosis 04/04/2020    Hyperkalemia 04/04/2020    Hyperglycemia 04/04/2020    Class 1 obesity in adult 04/04/2020    Distal radius fracture 09/19/2011     Family History   Problem Relation Age of Onset    Colon Cancer Mother       Social History     Tobacco Use    Smoking status: Never Smoker    Smokeless tobacco: Never Used   Substance Use Topics    Alcohol use: Yes     Alcohol/week: 1.0 standard drinks     Types: 1 Glasses of wine per week     Previous surgical history: Left distal radius open reduction internal fixation. History reviewed. No pertinent past medical history. History reviewed. No pertinent surgical history. Prior to Admission medications    Medication Sig Start Date End Date Taking? Authorizing Provider   multivitamin, tx-iron-ca-min (THERA-M w/ IRON) 9 mg iron-400 mcg tab tablet Take 1 Tab by mouth daily. Yes Provider, Historical   cetirizine (ZyrTEC) 10 mg tablet Take 5 mg by mouth two (2) times a day. Yes Provider, Historical   aspirin delayed-release 81 mg tablet Take 81 mg by mouth daily. Yes Provider, Historical   docusate sodium (COLACE) 100 mg capsule Take 100 mg by mouth two (2) times daily as needed for Constipation. Provider, Historical   OTHER,NON-FORMULARY, Fiber pill daily    Provider, Historical     Current Facility-Administered Medications   Medication Dose Route Frequency    HYDROmorphone (PF) (DILAUDID) injection 1 mg  1 mg IntraVENous Q3H PRN    0.9% sodium chloride infusion  75 mL/hr IntraVENous CONTINUOUS    sodium chloride (NS) flush 5-40 mL  5-40 mL IntraVENous Q8H    sodium chloride (NS) flush 5-40 mL  5-40 mL IntraVENous PRN    acetaminophen (TYLENOL) tablet 650 mg  650 mg Oral Q4H PRN    oxyCODONE-acetaminophen (PERCOCET) 5-325 mg per tablet 1 Tab  1 Tab Oral Q4H PRN    enoxaparin (LOVENOX) injection 40 mg  40 mg SubCUTAneous Q24H    insulin lispro (HUMALOG) injection   SubCUTAneous AC&HS    glucose chewable tablet 16 g  4 Tab Oral PRN    glucagon (GLUCAGEN) injection 1 mg  1 mg IntraMUSCular PRN    dextrose 10% infusion 0-250 mL  0-250 mL IntraVENous PRN    ceFAZolin (ANCEF) 2 g in sterile water (preservative free) 20 mL IV syringe  2 g IntraVENous ON CALL TO OR    prochlorperazine (COMPAZINE) injection 10 mg  10 mg IntraVENous Q4H PRN      No Known Allergies     Review of Systems: Does report nausea and left hip pain. Denies fever/chills/headache/blurry vision/loss of hearing/trouble swallowing/chest pain/ shortness of breath/abdominal pain/diarrhea/incontinence/pain with urination or passing bowels/ extremity numbness or weakness/ skin issues or any other complaints. X-rays of the left femur and hip demonstrate left three-part intertrochanteric femur fracture with displaced lesser trochanter with minimal displacement of intertrochanteric fracture. No obvious fractures distally of the femur.   Does have baseline left hip degenerative arthritis    Objective:     Patient Vitals for the past 8 hrs:   BP Temp Pulse Resp SpO2   04/05/20 0942 121/60 98.6 °F (37 °C) 65 16 97 %   04/05/20 0720 121/63 97.6 °F (36.4 °C) 73 16 98 %   04/05/20 0400     96 %   04/05/20 0346 143/70 97.5 °F (36.4 °C) 69 17 98 %     Temp (24hrs), Av.1 °F (36.7 °C), Min:97.5 °F (36.4 °C), Max:98.6 °F (37 °C)      Gen: Well-developed,  in no acute distress, alert and oriented x 3  HEENT: NCAT, PERRLA  CARDS: Regular rate and rhythm  RESP: Nonlaborred breathing, no use of accessory muscles  Abdomen: NT ND soft, no peritoneal signs  SKIN: Intact   Musculoskeletal:  Left lower extremity:shortened and externally rotated. Pain with manipulation and palpation around the hip. NTTP rest of leg distally. No skin breakdown around the hip. SILT sural/saph/DP/SP/PT. + EHL/FHL, DF/PF ankle and toes. DP pulse 2+    Bilateral upper extremities and right lower extremity: Well-healed surgical incision on the left wrist.  No open wounds or other abnormalities of the other extremities. No bony abnormalities on palpation. Skin intact. NTTP globally. Good passive ROM all major joints. Motor intact in all major muscle groups. SILT all dermatomes grossly. Good peripheral pulses        Labs:   Recent Results (from the past 24 hour(s))   GLUCOSE, POC    Collection Time: 20  5:43 PM   Result Value Ref Range    Glucose (POC) 203 (H) 65 - 100 mg/dL    Performed by Panchito Irene (tech)    CBC WITH AUTOMATED DIFF    Collection Time: 20  5:44 PM   Result Value Ref Range    WBC 13.6 (H) 3.6 - 11.0 K/uL    RBC 4.38 3.80 - 5.20 M/uL    HGB 13.6 11.5 - 16.0 g/dL    HCT 40.6 35.0 - 47.0 %    MCV 92.7 80.0 - 99.0 FL    MCH 31.1 26.0 - 34.0 PG    MCHC 33.5 30.0 - 36.5 g/dL    RDW 12.8 11.5 - 14.5 %    PLATELET 101 (L) 711 - 400 K/uL    MPV 12.1 8.9 - 12.9 FL    NRBC 0.0 0  WBC    ABSOLUTE NRBC 0.00 0.00 - 0.01 K/uL    NEUTROPHILS 89 (H) 32 - 75 %    LYMPHOCYTES 5 (L) 12 - 49 %    MONOCYTES 5 5 - 13 %    EOSINOPHILS 0 0 - 7 %    BASOPHILS 0 0 - 1 %    IMMATURE GRANULOCYTES 1 (H) 0.0 - 0.5 %    ABS. NEUTROPHILS 12.1 (H) 1.8 - 8.0 K/UL    ABS. LYMPHOCYTES 0.7 (L) 0.8 - 3.5 K/UL    ABS. MONOCYTES 0.7 0.0 - 1.0 K/UL    ABS.  EOSINOPHILS 0.0 0.0 - 0.4 K/UL    ABS. BASOPHILS 0.0 0.0 - 0.1 K/UL    ABS. IMM. GRANS. 0.1 (H) 0.00 - 0.04 K/UL    DF AUTOMATED      RBC COMMENTS NORMOCYTIC, NORMOCHROMIC     METABOLIC PANEL, COMPREHENSIVE    Collection Time: 04/04/20  5:44 PM   Result Value Ref Range    Sodium 134 (L) 136 - 145 mmol/L    Potassium 5.7 (H) 3.5 - 5.1 mmol/L    Chloride 108 97 - 108 mmol/L    CO2 17 (L) 21 - 32 mmol/L    Anion gap 9 5 - 15 mmol/L    Glucose 209 (H) 65 - 100 mg/dL    BUN 18 6 - 20 MG/DL    Creatinine 0.58 0.55 - 1.02 MG/DL    BUN/Creatinine ratio 31 (H) 12 - 20      GFR est AA >60 >60 ml/min/1.73m2    GFR est non-AA >60 >60 ml/min/1.73m2    Calcium 9.3 8.5 - 10.1 MG/DL    Bilirubin, total 0.9 0.2 - 1.0 MG/DL    ALT (SGPT) 27 12 - 78 U/L    AST (SGOT) 42 (H) 15 - 37 U/L    Alk.  phosphatase 88 45 - 117 U/L    Protein, total 6.8 6.4 - 8.2 g/dL    Albumin 3.3 (L) 3.5 - 5.0 g/dL    Globulin 3.5 2.0 - 4.0 g/dL    A-G Ratio 0.9 (L) 1.1 - 2.2     LIPASE    Collection Time: 04/04/20  5:44 PM   Result Value Ref Range    Lipase <10 (L) 73 - 393 U/L   POTASSIUM    Collection Time: 04/04/20  6:53 PM   Result Value Ref Range    Potassium 3.3 (L) 3.5 - 5.1 mmol/L   CK    Collection Time: 04/04/20  6:53 PM   Result Value Ref Range    CK 61 26 - 192 U/L   EKG, 12 LEAD, INITIAL    Collection Time: 04/04/20  7:39 PM   Result Value Ref Range    Ventricular Rate 94 BPM    Atrial Rate 94 BPM    P-R Interval 148 ms    QRS Duration 76 ms    Q-T Interval 384 ms    QTC Calculation (Bezet) 480 ms    Calculated P Axis 48 degrees    Calculated R Axis 20 degrees    Calculated T Axis 37 degrees    Diagnosis       Normal sinus rhythm  Normal ECG  No previous ECGs available     GLUCOSE, POC    Collection Time: 04/04/20  9:00 PM   Result Value Ref Range    Glucose (POC) 181 (H) 65 - 100 mg/dL    Performed by Marsha Yee (PCT)    TYPE & SCREEN    Collection Time: 04/05/20  3:25 AM   Result Value Ref Range    Crossmatch Expiration 04/08/2020     ABO/Rh(D) A POSITIVE Antibody screen NEG    CBC W/O DIFF    Collection Time: 04/05/20  3:25 AM   Result Value Ref Range    WBC 8.7 3.6 - 11.0 K/uL    RBC 3.97 3.80 - 5.20 M/uL    HGB 12.2 11.5 - 16.0 g/dL    HCT 36.8 35.0 - 47.0 %    MCV 92.7 80.0 - 99.0 FL    MCH 30.7 26.0 - 34.0 PG    MCHC 33.2 30.0 - 36.5 g/dL    RDW 12.7 11.5 - 14.5 %    PLATELET 747 (L) 655 - 400 K/uL    MPV 11.4 8.9 - 12.9 FL    NRBC 0.0 0  WBC    ABSOLUTE NRBC 0.00 0.00 - 7.91 K/uL   METABOLIC PANEL, BASIC    Collection Time: 04/05/20  3:25 AM   Result Value Ref Range    Sodium 138 136 - 145 mmol/L    Potassium 3.9 3.5 - 5.1 mmol/L    Chloride 106 97 - 108 mmol/L    CO2 26 21 - 32 mmol/L    Anion gap 6 5 - 15 mmol/L    Glucose 165 (H) 65 - 100 mg/dL    BUN 22 (H) 6 - 20 MG/DL    Creatinine 0.53 (L) 0.55 - 1.02 MG/DL    BUN/Creatinine ratio 42 (H) 12 - 20      GFR est AA >60 >60 ml/min/1.73m2    GFR est non-AA >60 >60 ml/min/1.73m2    Calcium 9.1 8.5 - 10.1 MG/DL   PHOSPHORUS    Collection Time: 04/05/20  3:25 AM   Result Value Ref Range    Phosphorus 4.9 (H) 2.6 - 4.7 MG/DL   MAGNESIUM    Collection Time: 04/05/20  3:25 AM   Result Value Ref Range    Magnesium 2.2 1.6 - 2.4 mg/dL   SAMPLES BEING HELD    Collection Time: 04/05/20  3:25 AM   Result Value Ref Range    SAMPLES BEING HELD 1sst     COMMENT        Add-on orders for these samples will be processed based on acceptable specimen integrity and analyte stability, which may vary by analyte.    URINALYSIS W/ REFLEX CULTURE    Collection Time: 04/05/20  4:56 AM   Result Value Ref Range    Color YELLOW/STRAW      Appearance CLEAR CLEAR      Specific gravity 1.030 1.003 - 1.030      pH (UA) 5.5 5.0 - 8.0      Protein NEGATIVE  NEG mg/dL    Glucose NEGATIVE  NEG mg/dL    Ketone 15 (A) NEG mg/dL    Bilirubin NEGATIVE  NEG      Blood NEGATIVE  NEG      Urobilinogen 0.2 0.2 - 1.0 EU/dL    Nitrites NEGATIVE  NEG      Leukocyte Esterase NEGATIVE  NEG      WBC 0-4 0 - 4 /hpf    RBC 5-10 0 - 5 /hpf    Epithelial cells FEW FEW /lpf    Bacteria NEGATIVE  NEG /hpf    UA:UC IF INDICATED CULTURE NOT INDICATED BY UA RESULT CNI     SAMPLES BEING HELD    Collection Time: 04/05/20  4:56 AM   Result Value Ref Range    SAMPLES BEING HELD 1UA,1UC     COMMENT        Add-on orders for these samples will be processed based on acceptable specimen integrity and analyte stability, which may vary by analyte. GLUCOSE, POC    Collection Time: 04/05/20  6:15 AM   Result Value Ref Range    Glucose (POC) 154 (H) 65 - 100 mg/dL    Performed by Jasson Lubin (PCT)          Impression:     Patient Active Problem List    Diagnosis Date Noted    Hip fracture ([facility] [address] 04/04/2020    Leukocytosis 04/04/2020    Hyperkalemia 04/04/2020    Hyperglycemia 04/04/2020    Class 1 obesity in adult 04/04/2020    Distal radius fracture 09/19/2011     Principal Problem:    Hip fracture ([facility] [address] (4/4/2020)    Active Problems:    Leukocytosis (4/4/2020)      Hyperkalemia (4/4/2020)      Hyperglycemia (4/4/2020)      Class 1 obesity in adult (4/4/2020)        Plan:   75-year-old female status post ground-level fall with left displaced intertrochanteric femur fracture. Admit to medicine, appreciate help in medical management and optimization.  Plan for Left hip intramedullary nailing.   The risks and benefits of this were explained at length including but not limited to infection, wound issues, damage to nearby structures including nerves arteries and veins, bleeding requiring blood transfusion, malunion/nonunion, hardware failure or painful hardware, leg length strep and C/malrotation, need for further surgeries, DVT/PE, or heart attack stroke or death.    -N.p.o.  -Patient is been marked and consented  -Remain bedrest until surgery  -Hold anticoagulation today  -Preop antibiotics  -TXA  -Type and screen  -Hold PT postoperatively likely will be weightbearing as tolerated for short distances  -Disposition: Home with home health versus skilled nursing facility  -Follow-up: 2 to 4 weeks 6 MD Archie Mckinley.  Joelle Patten MD  [facility] [phone]  Medical Staff : Dana Hamilton  Office : [phone]

## 2020-04-05 NOTE — PROGRESS NOTES
Problem: Falls - Risk of  Goal: *Absence of Falls  Description: Document Jean Paul Cease Fall Risk and appropriate interventions in the flowsheet. Outcome: Progressing Towards Goal  Note: Fall Risk Interventions:            Medication Interventions: Patient to call before getting OOB, Teach patient to arise slowly, Bed/chair exit alarm    Elimination Interventions: Call light in reach    History of Falls Interventions: Bed/chair exit alarm         Problem: Patient Education: Go to Patient Education Activity  Goal: Patient/Family Education  Outcome: Progressing Towards Goal     Problem: Pressure Injury - Risk of  Goal: *Prevention of pressure injury  Description: Document Demetrio Scale and appropriate interventions in the flowsheet.   Outcome: Progressing Towards Goal  Note: Pressure Injury Interventions:       Moisture Interventions: Absorbent underpads    Activity Interventions: PT/OT evaluation    Mobility Interventions: PT/OT evaluation, HOB 30 degrees or less    Nutrition Interventions: (NPO)    Friction and Shear Interventions: Lift team/patient mobility team                Problem: Patient Education: Go to Patient Education Activity  Goal: Patient/Family Education  Outcome: Progressing Towards Goal

## 2020-04-05 NOTE — PROGRESS NOTES
TRANSFER - OUT REPORT:    Verbal report given to Ed (name) on [place]  being transferred to OR (unit) for ordered procedure       Report consisted of patients Situation, Background, Assessment and   Recommendations(SBAR). Information from the following report(s) SBAR, Kardex, ED Summary, Intake/Output and MAR was reviewed with the receiving nurse. Lines:   Peripheral IV 04/04/20 Right Hand (Active)   Site Assessment Clean, dry, & intact 4/5/2020  4:00 AM   Phlebitis Assessment 0 4/5/2020  4:00 AM   Infiltration Assessment 0 4/5/2020  4:00 AM   Dressing Status Clean, dry, & intact 4/5/2020  4:00 AM   Dressing Type Transparent 4/5/2020  4:00 AM   Hub Color/Line Status Pink 4/5/2020  4:00 AM   Action Taken Open ports on tubing capped 4/5/2020  4:00 AM   Alcohol Cap Used Yes 4/5/2020  4:00 AM        Opportunity for questions and clarification was provided.       Patient transported with:   O2 @ 2 liters   Visit Vitals  /63 (BP 1 Location: Left arm, BP Patient Position: At rest)   Pulse 73   Temp 97.6 °F (36.4 °C)   Resp 16   Ht 5' 10\" (1.778 m)   Wt 99.8 kg (220 lb)   SpO2 98%   Breastfeeding No   BMI 31.57 kg/m²

## 2020-04-06 ENCOUNTER — APPOINTMENT (OUTPATIENT)
Dept: GENERAL RADIOLOGY | Age: 70
DRG: 481 | End: 2020-04-06
Attending: ORTHOPAEDIC SURGERY
Payer: MEDICARE

## 2020-04-06 LAB
ANION GAP SERPL CALC-SCNC: 4 MMOL/L (ref 5–15)
ATRIAL RATE: 94 BPM
BASOPHILS # BLD: 0 K/UL (ref 0–0.1)
BASOPHILS NFR BLD: 0 % (ref 0–1)
BUN SERPL-MCNC: 27 MG/DL (ref 6–20)
BUN/CREAT SERPL: 41 (ref 12–20)
CALCIUM SERPL-MCNC: 8.7 MG/DL (ref 8.5–10.1)
CALCULATED P AXIS, ECG09: 48 DEGREES
CALCULATED R AXIS, ECG10: 20 DEGREES
CALCULATED T AXIS, ECG11: 37 DEGREES
CHLORIDE SERPL-SCNC: 108 MMOL/L (ref 97–108)
CO2 SERPL-SCNC: 27 MMOL/L (ref 21–32)
CREAT SERPL-MCNC: 0.66 MG/DL (ref 0.55–1.02)
DIAGNOSIS, 93000: NORMAL
DIFFERENTIAL METHOD BLD: ABNORMAL
EOSINOPHIL # BLD: 0 K/UL (ref 0–0.4)
EOSINOPHIL NFR BLD: 0 % (ref 0–7)
ERYTHROCYTE [DISTWIDTH] IN BLOOD BY AUTOMATED COUNT: 13.1 % (ref 11.5–14.5)
GLUCOSE BLD STRIP.AUTO-MCNC: 123 MG/DL (ref 65–100)
GLUCOSE BLD STRIP.AUTO-MCNC: 125 MG/DL (ref 65–100)
GLUCOSE SERPL-MCNC: 122 MG/DL (ref 65–100)
HCT VFR BLD AUTO: 27.1 % (ref 35–47)
HCT VFR BLD AUTO: 27.5 % (ref 35–47)
HGB BLD-MCNC: 8.8 G/DL (ref 11.5–16)
HGB BLD-MCNC: 9 G/DL (ref 11.5–16)
IMM GRANULOCYTES # BLD AUTO: 0.1 K/UL (ref 0–0.04)
IMM GRANULOCYTES NFR BLD AUTO: 1 % (ref 0–0.5)
LYMPHOCYTES # BLD: 0.8 K/UL (ref 0.8–3.5)
LYMPHOCYTES NFR BLD: 9 % (ref 12–49)
MCH RBC QN AUTO: 30.8 PG (ref 26–34)
MCHC RBC AUTO-ENTMCNC: 32.7 G/DL (ref 30–36.5)
MCV RBC AUTO: 94.2 FL (ref 80–99)
MONOCYTES # BLD: 0.9 K/UL (ref 0–1)
MONOCYTES NFR BLD: 9 % (ref 5–13)
NEUTS SEG # BLD: 7.3 K/UL (ref 1.8–8)
NEUTS SEG NFR BLD: 81 % (ref 32–75)
NRBC # BLD: 0 K/UL (ref 0–0.01)
NRBC BLD-RTO: 0 PER 100 WBC
P-R INTERVAL, ECG05: 148 MS
PLATELET # BLD AUTO: 124 K/UL (ref 150–400)
PMV BLD AUTO: 11.4 FL (ref 8.9–12.9)
POTASSIUM SERPL-SCNC: 4.2 MMOL/L (ref 3.5–5.1)
Q-T INTERVAL, ECG07: 384 MS
QRS DURATION, ECG06: 76 MS
QTC CALCULATION (BEZET), ECG08: 480 MS
RBC # BLD AUTO: 2.92 M/UL (ref 3.8–5.2)
SERVICE CMNT-IMP: ABNORMAL
SERVICE CMNT-IMP: ABNORMAL
SODIUM SERPL-SCNC: 139 MMOL/L (ref 136–145)
VENTRICULAR RATE, ECG03: 94 BPM
WBC # BLD AUTO: 9 K/UL (ref 3.6–11)

## 2020-04-06 PROCEDURE — 82962 GLUCOSE BLOOD TEST: CPT

## 2020-04-06 PROCEDURE — 97161 PT EVAL LOW COMPLEX 20 MIN: CPT

## 2020-04-06 PROCEDURE — 74011250637 HC RX REV CODE- 250/637: Performed by: INTERNAL MEDICINE

## 2020-04-06 PROCEDURE — 80048 BASIC METABOLIC PNL TOTAL CA: CPT

## 2020-04-06 PROCEDURE — 85025 COMPLETE CBC W/AUTO DIFF WBC: CPT

## 2020-04-06 PROCEDURE — 85018 HEMOGLOBIN: CPT

## 2020-04-06 PROCEDURE — 36415 COLL VENOUS BLD VENIPUNCTURE: CPT

## 2020-04-06 PROCEDURE — 65270000029 HC RM PRIVATE

## 2020-04-06 PROCEDURE — 74011250636 HC RX REV CODE- 250/636: Performed by: ORTHOPAEDIC SURGERY

## 2020-04-06 PROCEDURE — 97165 OT EVAL LOW COMPLEX 30 MIN: CPT

## 2020-04-06 PROCEDURE — 74011250637 HC RX REV CODE- 250/637: Performed by: ORTHOPAEDIC SURGERY

## 2020-04-06 PROCEDURE — 74011000250 HC RX REV CODE- 250: Performed by: ORTHOPAEDIC SURGERY

## 2020-04-06 PROCEDURE — 97110 THERAPEUTIC EXERCISES: CPT

## 2020-04-06 PROCEDURE — 97535 SELF CARE MNGMENT TRAINING: CPT

## 2020-04-06 PROCEDURE — 97530 THERAPEUTIC ACTIVITIES: CPT

## 2020-04-06 PROCEDURE — 97116 GAIT TRAINING THERAPY: CPT

## 2020-04-06 RX ORDER — SODIUM CHLORIDE, SODIUM LACTATE, POTASSIUM CHLORIDE, CALCIUM CHLORIDE 600; 310; 30; 20 MG/100ML; MG/100ML; MG/100ML; MG/100ML
125 INJECTION, SOLUTION INTRAVENOUS CONTINUOUS
Status: DISCONTINUED | OUTPATIENT
Start: 2020-04-06 | End: 2020-04-06

## 2020-04-06 RX ORDER — NALOXONE HYDROCHLORIDE 0.4 MG/ML
0.04 INJECTION, SOLUTION INTRAMUSCULAR; INTRAVENOUS; SUBCUTANEOUS
Status: DISCONTINUED | OUTPATIENT
Start: 2020-04-06 | End: 2020-04-06

## 2020-04-06 RX ORDER — LIDOCAINE HYDROCHLORIDE 10 MG/ML
0.1 INJECTION, SOLUTION EPIDURAL; INFILTRATION; INTRACAUDAL; PERINEURAL AS NEEDED
Status: DISCONTINUED | OUTPATIENT
Start: 2020-04-06 | End: 2020-04-06

## 2020-04-06 RX ORDER — CETIRIZINE HCL 10 MG
5 TABLET ORAL 2 TIMES DAILY
Status: DISCONTINUED | OUTPATIENT
Start: 2020-04-06 | End: 2020-04-08 | Stop reason: HOSPADM

## 2020-04-06 RX ORDER — CETIRIZINE HCL 10 MG
5 TABLET ORAL DAILY
Status: DISCONTINUED | OUTPATIENT
Start: 2020-04-06 | End: 2020-04-06

## 2020-04-06 RX ORDER — FLUMAZENIL 0.1 MG/ML
0.2 INJECTION INTRAVENOUS
Status: DISCONTINUED | OUTPATIENT
Start: 2020-04-06 | End: 2020-04-06

## 2020-04-06 RX ORDER — SODIUM CHLORIDE 0.9 % (FLUSH) 0.9 %
5-40 SYRINGE (ML) INJECTION EVERY 8 HOURS
Status: DISCONTINUED | OUTPATIENT
Start: 2020-04-06 | End: 2020-04-06

## 2020-04-06 RX ORDER — SODIUM CHLORIDE 0.9 % (FLUSH) 0.9 %
5-40 SYRINGE (ML) INJECTION AS NEEDED
Status: DISCONTINUED | OUTPATIENT
Start: 2020-04-06 | End: 2020-04-08 | Stop reason: HOSPADM

## 2020-04-06 RX ADMIN — ACETAMINOPHEN 650 MG: 325 TABLET ORAL at 06:17

## 2020-04-06 RX ADMIN — OYSTER SHELL CALCIUM WITH VITAMIN D 1 TABLET: 500; 200 TABLET, FILM COATED ORAL at 12:03

## 2020-04-06 RX ADMIN — ENOXAPARIN SODIUM 40 MG: 40 INJECTION SUBCUTANEOUS at 21:49

## 2020-04-06 RX ADMIN — CETIRIZINE HYDROCHLORIDE 5 MG: 10 TABLET, FILM COATED ORAL at 21:49

## 2020-04-06 RX ADMIN — WATER 2 G: 1 INJECTION INTRAMUSCULAR; INTRAVENOUS; SUBCUTANEOUS at 01:16

## 2020-04-06 RX ADMIN — OYSTER SHELL CALCIUM WITH VITAMIN D 1 TABLET: 500; 200 TABLET, FILM COATED ORAL at 07:57

## 2020-04-06 RX ADMIN — CETIRIZINE HYDROCHLORIDE 5 MG: 10 TABLET, FILM COATED ORAL at 10:49

## 2020-04-06 RX ADMIN — Medication 10 ML: at 14:50

## 2020-04-06 RX ADMIN — SENNOSIDES AND DOCUSATE SODIUM 1 TABLET: 8.6; 5 TABLET ORAL at 17:19

## 2020-04-06 RX ADMIN — Medication 10 ML: at 21:50

## 2020-04-06 RX ADMIN — POLYETHYLENE GLYCOL 3350 17 G: 17 POWDER, FOR SOLUTION ORAL at 08:00

## 2020-04-06 RX ADMIN — Medication 10 ML: at 06:18

## 2020-04-06 RX ADMIN — WATER 2 G: 1 INJECTION INTRAMUSCULAR; INTRAVENOUS; SUBCUTANEOUS at 07:59

## 2020-04-06 RX ADMIN — Medication 10 ML: at 14:51

## 2020-04-06 RX ADMIN — OXYCODONE HYDROCHLORIDE AND ACETAMINOPHEN 1 TABLET: 5; 325 TABLET ORAL at 07:58

## 2020-04-06 RX ADMIN — OXYCODONE HYDROCHLORIDE AND ACETAMINOPHEN 1 TABLET: 5; 325 TABLET ORAL at 16:40

## 2020-04-06 RX ADMIN — SODIUM CHLORIDE 125 ML/HR: 900 INJECTION, SOLUTION INTRAVENOUS at 06:57

## 2020-04-06 RX ADMIN — SENNOSIDES AND DOCUSATE SODIUM 1 TABLET: 8.6; 5 TABLET ORAL at 08:00

## 2020-04-06 RX ADMIN — ACETAMINOPHEN 650 MG: 325 TABLET ORAL at 17:19

## 2020-04-06 RX ADMIN — Medication 10 ML: at 06:17

## 2020-04-06 RX ADMIN — OYSTER SHELL CALCIUM WITH VITAMIN D 1 TABLET: 500; 200 TABLET, FILM COATED ORAL at 16:42

## 2020-04-06 RX ADMIN — OXYCODONE HYDROCHLORIDE AND ACETAMINOPHEN 1 TABLET: 5; 325 TABLET ORAL at 12:03

## 2020-04-06 NOTE — PROGRESS NOTES
[facility] [place] [facility]  [address]  [phone]      Medical Progress Note      NAME: Essence Little   :  1950  MRM:  [id number]    Date/Time: 2020  10:43 AM         Subjective:     Chief Complaint:  \"I'm ok\"    Pt seen and examined. No complaints. ROS:  (bold if positive, if negative)             Objective:       Vitals:          Last 24hrs VS reviewed since prior progress note. Most recent are:    Visit Vitals  /72 (BP 1 Location: Right arm, BP Patient Position: At rest;Sitting)   Pulse 80   Temp 97.8 °F (36.6 °C)   Resp 16   Ht 5' 10\" (1.778 m)   Wt 104.2 kg (229 lb 11.2 oz)   SpO2 94%   Breastfeeding No   BMI 32.96 kg/m²     SpO2 Readings from Last 6 Encounters:   20 94%   11 98%    O2 Flow Rate (L/min): 2 l/min       Intake/Output Summary (Last 24 hours) at 2020 1413  Last data filed at 2020 1321  Gross per 24 hour   Intake 3519.17 ml   Output 775 ml   Net 2744.17 ml          Exam:     Physical Exam:    Gen:  Well-developed, well-nourished, in no acute distress  HEENT:  Pink conjunctivae, PERRL, hearing intact to voice, moist mucous membranes  Neck:  Supple, without masses, thyroid non-tender  Resp:  No accessory muscle use, clear breath sounds without wheezes rales or rhonchi  Card:  No murmurs, normal S1, S2 without thrills, bruits or peripheral edema  Abd:  Soft, non-tender, non-distended, normoactive bowel sounds are present  Musc:  No cyanosis or clubbing  Skin:  No rashes or ulcers, skin turgor is good  Neuro:  Cranial nerves 3-12 are grossly intact,  strength is 5/5 bilaterally and dorsi / plantarflexion is 5/5 bilaterally, follows commands appropriately  Psych:  Good insight, oriented to person, place and time, alert  L hip dressing C/D/I.      Medications Reviewed: (see below)    Lab Data Reviewed: (see below)    ______________________________________________________________________    Medications:     Current Facility-Administered Medications   Medication Dose Route Frequency    sodium chloride (NS) flush 5-40 mL  5-40 mL IntraVENous PRN    cetirizine (ZYRTEC) tablet 5 mg  5 mg Oral BID    HYDROmorphone (PF) (DILAUDID) injection 1 mg  1 mg IntraVENous Q3H PRN    sodium chloride (NS) flush 5-40 mL  5-40 mL IntraVENous Q8H    sodium chloride (NS) flush 5-40 mL  5-40 mL IntraVENous PRN    acetaminophen (TYLENOL) tablet 650 mg  650 mg Oral Q6H    naloxone (NARCAN) injection 0.4 mg  0.4 mg IntraVENous PRN    calcium-vitamin D (OS-ENRIQUETA) 500 mg-200 unit tablet  1 Tab Oral TID WITH MEALS    senna-docusate (PERICOLACE) 8.6-50 mg per tablet 1 Tab  1 Tab Oral BID    polyethylene glycol (MIRALAX) packet 17 g  17 g Oral DAILY    [START ON 4/7/2020] bisacodyL (DULCOLAX) suppository 10 mg  10 mg Rectal DAILY PRN    sodium chloride (NS) flush 5-40 mL  5-40 mL IntraVENous Q8H    sodium chloride (NS) flush 5-40 mL  5-40 mL IntraVENous PRN    acetaminophen (TYLENOL) tablet 650 mg  650 mg Oral Q4H PRN    oxyCODONE-acetaminophen (PERCOCET) 5-325 mg per tablet 1 Tab  1 Tab Oral Q4H PRN    enoxaparin (LOVENOX) injection 40 mg  40 mg SubCUTAneous Q24H    insulin lispro (HUMALOG) injection   SubCUTAneous AC&HS    glucose chewable tablet 16 g  4 Tab Oral PRN    glucagon (GLUCAGEN) injection 1 mg  1 mg IntraMUSCular PRN    dextrose 10% infusion 0-250 mL  0-250 mL IntraVENous PRN    prochlorperazine (COMPAZINE) injection 10 mg  10 mg IntraVENous Q4H PRN            Lab Review:     Recent Labs     04/06/20  1153 04/06/20  0122 04/05/20  0325 04/04/20  1744   WBC  --  9.0 8.7 13.6*   HGB 8.8* 9.0* 12.2 13.6   HCT 27.1* 27.5* 36.8 40.6   PLT  --  124* 137* 147*     Recent Labs     04/06/20  0122 04/05/20  0325 04/04/20  1853 04/04/20  1744    138  --  134*   K 4.2 3.9 3.3* 5.7*    106  --  108   CO2 27 26  --  17*   * 165*  --  209*   BUN 27* 22*  --  18   CREA 0.66 0.53*  --  0.58   CA 8.7 9.1  --  9.3   MG  -- 2.2  --   --    PHOS  --  4.9*  --   --    ALB  --   --   --  3.3*   SGOT  --   --   --  42*   ALT  --   --   --  27     No components found for: [place]         Assessment / Plan:   Hip fracture ([facility] [address] (4/4/2020) - s/p repair   -PT/OT on board  -defer dispo to ortho; suspect may be able to go home with home health PT/OT   -Lovenox x 28 days for DVT prophylaxis        Leukocytosis (4/4/2020) - suspect acute stress reaction as UA and CXR WNL and resolved  -monitor      Hyperkalemia - resolved without treatment   -monitor        Hyperglycemia (4/4/2020) - A1c 5.7. No e/o DM   -STOP BG checks        Class 1 obesity in adult (4/4/2020)  -counseled on weight loss     Anemia - post surgical blood loss. Suspect appropriate.  Defer to ortho  -daily CBC; no e/o hematoma on exam of L hip                   Code status:              - patient is FULL CODE, no AMD on file,  Joi Bella is surrogate    Total time spent with patient: 71 805 [address] discussed with: Patient    Discussed:  Code Status, Care Plan and D/C Planning    Prophylaxis:  SCD's, Lovenox     Disposition:  TBD           ___________________________________________________    Attending Physician: Tarun Ivan MD

## 2020-04-06 NOTE — PROGRESS NOTES
Problem: Self Care Deficits Care Plan (Adult)  Goal: *Acute Goals and Plan of Care (Insert Text)  Description: FUNCTIONAL STATUS PRIOR TO ADMISSION: Patient was independent and active without use of DME.    HOME SUPPORT: The patient lived with  but did not require assist.    Occupational Therapy Goals  Initiated 4/6/2020     1. Patient will perform lower body dressing using Reacher and Stocking Aid at modified independence level within 7 days. 2. Patient will perform toilet transfers at contact-guard assist level using RW and grab bars prn within 7 days. 3. Patient will perform all aspects of toileting at modified independence level using RW and grab bars prn within 7 days. 4. Patient will demonstrate 3/3 hip precautions without cues within 7 days. Outcome: Progressing Towards Goal     Problem: Self Care Deficits Care Plan (Adult)  Goal: *Acute Goals and Plan of Care (Insert Text)  Description: FUNCTIONAL STATUS PRIOR TO ADMISSION: Patient was independent and active without use of DME.    HOME SUPPORT: The patient lived with  but did not require assist.    Occupational Therapy Goals  Initiated 4/6/2020     1. Patient will perform lower body dressing using Reacher and Stocking Aid at modified independence level within 7 days. 2. Patient will perform toilet transfers at contact-guard assist level using RW and grab bars prn within 7 days. 3. Patient will perform all aspects of toileting at modified independence level using RW and grab bars prn within 7 days. 4. Patient will demonstrate 3/3 hip precautions without cues within 7 days.       Outcome: Progressing Towards Goal    OCCUPATIONAL THERAPY EVALUATION  Patient: Rhonda Meier (16 y.o. female)  Date: 4/6/2020  Primary Diagnosis: Femur fracture, left ([facility] [address] [S72.92XA]  Hip fracture ([facility] [address] [S72.009A]  Procedure(s) (LRB):  LEFT FEMORAL INTERTROCHANTERIC NAIL INSERTION (Left) 1 Day Post-Op   Precautions: Fall, WBAT(short distance only)    ASSESSMENT  Based on the objective data described below, the patient presents with pain, impaired balance, decreased strength and ROM, and inability to complete ADLs at baseline level following L femoral intertrochanteric nailing on 4/5. Pt received in chair, reporting need to void. Pt able to complete stand-pivot transfer to [facility] with BUE support and increased time, blocking of L knee, and mod assist x1. Pt significantly benefiting from use of handrails on [facility] for transfer. With some difficulty, pt able to complete seated toilet hygiene with CGA for balance and min assist for clothing management. Pt reporting slight lightheadedness after return transfer to chair, pt BP dropping to 116/56 after toileting from 130/58 prior to activity. With brief rest break and cues for breathing, pt with quick recovery. Per pt, primary ADL concern upon discharge is ability to complete toilet transfers and toilet hygiene with minimal to no assist required. Pt somewhat discouraged by limited activity tolerance at this time, provided with education about recovery process and course of rehabilitation - pt receptive and motivated to participate. Before activity, seated: 130/58, 88 bpm    After toileting, seated: 116/56, 103 bpm    Current Level of Function Impacting Discharge (ADLs/self-care): mod assist x1 with increased time for stand-pivot transfers with UE support or RW    Functional Outcome Measure: The patient scored 50/100 on the Barthel Index. Other factors to consider for discharge: lives with  who can support as needed; symptoms of orthostatic hypotension     Patient will benefit from skilled therapy intervention to address the above noted impairments.        PLAN :  Recommendations and Planned Interventions: self care training, functional mobility training, therapeutic exercise, balance training, therapeutic activities, cognitive retraining, endurance activities, neuromuscular re-education, patient education, home safety training, and family training/education    Frequency/Duration: Patient will be followed by occupational therapy 5 times a week to address goals. Recommendation for discharge: (in order for the patient to meet his/her long term goals)  Occupational therapy at least 2 days/week in the home AND ensure assist and/or supervision for safety with functional mobility and ADLs     This discharge recommendation:  Has not yet been discussed the attending provider and/or case management    IF patient discharges home will need the following DME: TBD (pt may benefit from safety frame with handrails for toilet for safe toilet transfers)       SUBJECTIVE:   Patient stated I'm sorry. That wasn't as good as earlier\" after stand-pivot transfer to/from [facility]. OBJECTIVE DATA SUMMARY:   HISTORY:   History reviewed. No pertinent past medical history. History reviewed. No pertinent surgical history. Expanded or extensive additional review of patient history:     Home Situation  Home Environment: Private residence  # Steps to Enter: 3  Rails to Enter: Yes  Hand Rails : Bilateral(too wide)  One/Two Story Residence: [facility] level  Living Alone: No  Support Systems: Spouse/Significant Other/Partner  Patient Expects to be Discharged to[de-identified] Private residence  Current DME Used/Available at Home: Raised toilet seat, Grab bars  Tub or Shower Type: Shower    Hand dominance: Right    EXAMINATION OF PERFORMANCE DEFICITS:  Cognitive/Behavioral Status:  Neurologic State: Alert; Appropriate for age  Orientation Level: Oriented X4  Cognition: Appropriate decision making; Appropriate for age attention/concentration; Appropriate safety awareness; Follows commands  Perception: Appears intact  Perseveration: No perseveration noted  Safety/Judgement: Awareness of environment;Good awareness of safety precautions; Home safety; Insight into deficits      Hearing:   Auditory  Auditory Impairment: None      Vision/Perceptual:     Acuity: Within Defined Limits    Corrective Lenses: Glasses      Range of Motion:  AROM: Within functional limits  PROM: Within functional limits       Strength:  Strength: Generally decreased, functional       Coordination:  Coordination: Within functional limits  Fine Motor Skills-Upper: Left Intact; Right Intact    Gross Motor Skills-Upper: Left Intact; Right Intact      Tone & Sensation:  Tone: Normal  Sensation: Intact      Balance:  Sitting: Intact  Standing: Impaired; With support  Standing - Static: Constant support; Fair  Standing - Dynamic : Constant support;Poor    Functional Mobility and Transfers for ADLs:  Bed Mobility:  Pt received sitting in chair. Transfers:  Sit to Stand: Moderate assistance;Assist x1;Additional time  Stand to Sit: Moderate assistance;Assist x1;Additional time  Bed to Chair: Moderate assistance;Assist x1;Additional time  Bathroom Mobility: Moderate assistance  Toilet Transfer : Moderate assistance;Assist x1;Additional time      ADL Assessment:  Patient recalled and demonstrated avoiding extreme planes of movement with LLE during ADLs and functional mobility with verbal and visual cues. Feeding: Setup    Oral Facial Hygiene/Grooming: Setup(seated)    Upper Body Dressing: Minimum assistance    Lower Body Dressing: Total assistance    Toileting: Minimum assistance       ADL Intervention and task modifications: Toileting  Toileting Assistance: Minimum assistance  Bladder Hygiene: Contact guard assistance  Clothing Management: Minimum assistance  Cues: Verbal cues provided; Tactile cues provided;Visual cues provided    Cognitive Retraining  Safety/Judgement: Awareness of environment;Good awareness of safety precautions; Home safety; Insight into deficits    Bathing: Patient instructed when bathing to not submerge wound in water, stand to shower or sponge bathe, cover wound with plastic and tape to ensure no water reaches bandage/wound without cues. Patient indicated understanding.   Dressing joint: Patient instructed to don/doff Left LE first/last.  Patient instructed to don all clothing while sitting prior to standing, doff all clothing to knees while standing, then sit to doff clothing off from knees to feet in order to facilitate fall prevention, pain management, and energy conservation. Home safety: Patient instructed on home modifications and safety (raise height of ADL objects, appropriate height of chair surfaces, recliner safety, change of floor surfaces, clear pathways) to increase independence and fall prevention. Patient indicated understanding. Standing: Patient instructed to walk up to sink/counter top/surfaces, step into walker to increase safety of joint and fall prevention. Instructed to apply concept of hip contraindications to ADLs within the home (no extreme reaching across body to Right side, square off while using objects, slide objects along surfaces). Patient instructed to increase amount of time standing, observe standing position during ADLs in order to increase even weight bearing through bilateral LEs in order to increase independence with ADLs. Goal to be reached 30 days post - op, per orthopedic surgeon or per PT. Patient indicated understanding. Tub transfer: Patient instructed regarding when it is safe to begin transfer into tub (complete stairs with PT, advance exercises with PT high enough to clear tub height). Patient instructed to use the same technique as used with stairs when entering and exiting tub (\"up with the non-surgical, down with the surgical leg\"). Patient indicated understanding. Functional Measure:  Barthel Index:    Bathin  Bladder: 10  Bowels: 10  Groomin  Dressin  Feeding: 10  Mobility: 0  Stairs: 0  Toilet Use: 5  Transfer (Bed to Chair and Back): 5  Total: 50/100        The Barthel ADL Index: Guidelines  1. The index should be used as a record of what a patient does, not as a record of what a patient could do.   2. The main aim is to establish degree of independence from any help, physical or verbal, however minor and for whatever reason. 3. The need for supervision renders the patient not independent. 4. A patient's performance should be established using the best available evidence. Asking the patient, friends/relatives and nurses are the usual sources, but direct observation and common sense are also important. However direct testing is not needed. 5. Usually the patient's performance over the preceding 24-48 hours is important, but occasionally longer periods will be relevant. 6. Middle categories imply that the patient supplies over 50 per cent of the effort. 7. Use of aids to be independent is allowed. Griselda Pineda., Barthel, D.W. (2532). Functional evaluation: the Barthel Index. [address] [facility] (14)2. NANCY Mckeon, Jean Carlos Gómez., Rajan Domingo, Melinda, [address] (). Measuring the change indisability after inpatient rehabilitation; comparison of the responsiveness of the Barthel Index and Functional [place] Measure. Journal of Neurology, Neurosurgery, and Psychiatry, 66(4), 497-280. Jackie Mccord, N.J.A, DIANA Carter, & Netta Bowie, MYingA. (2004.) Assessment of post-stroke quality of life in cost-effectiveness studies: The usefulness of the Barthel Index and the EuroQoL-5D. Quality of Life Research, 15, 911-15       Occupational Therapy Evaluation Charge Determination   History Examination Decision-Making   LOW Complexity : Brief history review  LOW Complexity : 1-3 performance deficits relating to physical, cognitive , or psychosocial skils that result in activity limitations and / or participation restrictions  LOW Complexity : No comorbidities that affect functional and no verbal or physical assistance needed to complete eval tasks       Based on the above components, the patient evaluation is determined to be of the following complexity level: LOW   Pain Rating:  Pt reporting pain with movement.  Pt received pain medication prior to session. Activity Tolerance:   Fair and requires frequent rest breaks  Please refer to the flowsheet for vital signs taken during this treatment. After treatment patient left in no apparent distress:    Sitting in chair, Call bell within reach, and RN in room for blood draw     COMMUNICATION/EDUCATION:   The patients plan of care was discussed with: Physical therapist and Registered nurse. Home safety education was provided and the patient/caregiver indicated understanding., Patient/family have participated as able in goal setting and plan of care. , and Patient/family agree to work toward stated goals and plan of care. This patients plan of care is appropriate for delegation to MARLON.     Thank you for this referral.  Wisam Medina OT  Time Calculation: 40 mins

## 2020-04-06 NOTE — PROGRESS NOTES
INTERTROCHANTERIC FRACTURE DAILY NOTE    POD  1 Day Post-Op s/p Procedure(s):  LEFT FEMORAL INTERTROCHANTERIC NAIL INSERTION     ASSESSMENT / PLAN :   1. Pain Control : controlled at rest, better than preop  2. Overnight Issues : none   3. Wound or incisional issue : dressing c/d/i, reinforce as needs stay in place for 7-10 days, ok to shower with dressing in place  4. Therapy / Weight Bearing Recommendations : WBAT, short distances  5. DVT Prophylaxis : Mechanical and Lovenox, 4 weeks  6. Disposition : Home with HH and does not desire SNF  7. Hyperglycemia: cont SSI as needed but hgbA1c 5.7, likley stress related  8. SUBJECTIVE :     JERRICA overnight. Pain controlled and better than preop. Voiding without difficulty. Tolerating a regular diet. Denies CP/SOB/Abd pain/or other complaints. OBJECTIVE :     Patient Vitals for the past 12 hrs:   BP Temp Pulse Resp SpO2   04/06/20 0400     92 %   04/06/20 0347 127/76 98.1 °F (36.7 °C) 81 16 93 %   04/06/20 0000     94 %   04/05/20 2343 94/54 99.1 °F (37.3 °C) 90 15 94 %   04/05/20 2026 129/69 98.1 °F (36.7 °C) 92 16 95 %       Alert and oriented x 3. Examination of the left hip reveals that the dressing is c/d/i  Motor Exam +EHL/FHL, DF/PF ankle and toes  Sensation is intact to light touch sural/saph/DP/SP/PT  WWP  No calf pain. Labs:  Recent Labs     04/06/20  0122   HGB 9.0*   HCT 27.5*      K 4.2      CO2 27   BUN 27*   CREA 0.66   *      Patient mobility                                Aubrey Clark [facility], [place] [address] [phone]  Medical Staff : Grisel Watosn  Office : [phone]

## 2020-04-06 NOTE — PROGRESS NOTES
Occupational Therapy    Orders received, chart reviewed and patient evaluated by occupational therapy. Pending progression with skilled acute occupational therapy, recommend:  Occupational therapy at least 2 days/week in the home AND ensure assist and/or supervision for safety with functional mobility and ADLs     Recommend with nursing patient to complete as able in order to maintain strength, endurance and independence: OOB to chair 3x/day with RW and mod assist x1 and stand-pivot transfer to the [facility] with RW and mod assist x1 Thank you for your assistance. Full evaluation to follow.      Mandy Mckeon OTR/L

## 2020-04-06 NOTE — PROGRESS NOTES
Problem: Falls - Risk of  Goal: *Absence of Falls  Description: Document Kourtney Chunuro Fall Risk and appropriate interventions in the flowsheet. Outcome: Progressing Towards Goal  Note: Fall Risk Interventions:  Mobility Interventions: OT consult for ADLs, PT Consult for mobility concerns         Medication Interventions: Patient to call before getting OOB, Teach patient to arise slowly    Elimination Interventions: Call light in reach, Patient to call for help with toileting needs    History of Falls Interventions: Utilize gait belt for transfer/ambulation         Problem: Patient Education: Go to Patient Education Activity  Goal: Patient/Family Education  Outcome: Progressing Towards Goal     Problem: Pressure Injury - Risk of  Goal: *Prevention of pressure injury  Description: Document Demetrio Scale and appropriate interventions in the flowsheet.   Outcome: Progressing Towards Goal  Note: Pressure Injury Interventions:       Moisture Interventions: Minimize layers, Maintain skin hydration (lotion/cream)    Activity Interventions: PT/OT evaluation    Mobility Interventions: PT/OT evaluation    Nutrition Interventions: Offer support with meals,snacks and hydration, Document food/fluid/supplement intake    Friction and Shear Interventions: Minimize layers                Problem: Patient Education: Go to Patient Education Activity  Goal: Patient/Family Education  Outcome: Progressing Towards Goal

## 2020-04-06 NOTE — PROGRESS NOTES
Pt. stated SCDs cause her left leg to spasm and she does not want them on.     4/6/20 at 0600- ptYing Hutchinson was DC'd per nurse driven protocol.

## 2020-04-06 NOTE — PROGRESS NOTES
Problem: Mobility Impaired (Adult and Pediatric)  Goal: *Acute Goals and Plan of Care (Insert Text)  Description: FUNCTIONAL STATUS PRIOR TO ADMISSION: Patient was independent and active without use of DME.    HOME SUPPORT PRIOR TO ADMISSION: The patient lived with  but did not require assist.    Physical Therapy Goals  Initiated 4/6/2020    1. Patient will move from supine to sit and sit to supine  in bed with modified independence within 4 days. 2. Patient will perform sit to stand with modified independence within 4 days. 3. Patient will ambulate with supervision/set-up for 50 feet with the least restrictive device within 4 days. 4. Patient will ascend/descend 3 stairs with 1 handrail(s) with minimal assistance/contact guard assist within 4 days. 5. Patient will perform hip home exercise program per protocol with independence within 4 days. 4/6/2020 1522 by Ben Perea PT  Outcome: Progressing Towards Goal   PHYSICAL THERAPY TREATMENT  Patient: Anahi Ann (65 y.o. female)  Date: 4/6/2020  Diagnosis: Femur fracture, left ([facility]) [S72.92XA]  Hip fracture ([address] [S72.009A]   Hip fracture ([address]  Procedure(s) (LRB):  LEFT FEMORAL INTERTROCHANTERIC NAIL INSERTION (Left) 1 Day Post-Op  Precautions: Fall, WBAT(short distance only)  Chart, physical therapy assessment, plan of care and goals were reviewed. ASSESSMENT  Patient continues with skilled PT services and is progressing towards goals. Patient was able to progress to short distance gait training around the foot of the bed with upwards of Min-Mod A x 1. Minimal increase in pain with ambulation. Requires verbal cueing for proper sequencing and to use UEs to off-weight LLE. Pt responds well to feedback and is highly motivated to increase independence.       Current Level of Function Impacting Discharge (mobility/balance): Min A for mobility    Other factors to consider for discharge: steps to enter home(3 steps)         PLAN :  Patient continues to benefit from skilled intervention to address the above impairments. Continue treatment per established plan of care. to address goals. Recommendation for discharge: (in order for the patient to meet his/her long term goals)  Physical therapy at least 2 days/week in the home     This discharge recommendation:  Has been made in collaboration with the attending provider and/or case management    IF patient discharges home will need the following DME: bedside commode and shower chair       SUBJECTIVE:   Patient stated I think I could use my arms more.     OBJECTIVE DATA SUMMARY:   Critical Behavior:  Neurologic State: Alert, Eyes open spontaneously  Orientation Level: Oriented X4  Cognition: Appropriate decision making, Appropriate for age attention/concentration, Appropriate safety awareness, Follows commands  Safety/Judgement: Awareness of environment, Good awareness of safety precautions, Home safety, Insight into deficits  Functional Mobility Training:  Bed Mobility:  Rolling: Minimum assistance  Supine to Sit: Minimum assistance;Assist x1;Additional time  Sit to Supine: Minimum assistance  Scooting: Minimum assistance        Transfers:  Sit to Stand: Minimum assistance;Assist x1;Additional time  Stand to Sit: Minimum assistance;Assist x1;Additional time        Bed to Chair: Moderate assistance;Assist x1;Additional time                    Balance:  Sitting: Intact  Standing: Impaired  Standing - Static: Fair;Constant support  Standing - Dynamic : Fair;Constant support  Ambulation/Gait Training:  Distance (ft): 15 Feet (ft)  Assistive Device: Gait belt;Walker, rolling  Ambulation - Level of Assistance: Minimal assistance;Assist x1;Additional time        Gait Abnormalities: Antalgic;Decreased step clearance; Step to gait  Right Side Weight Bearing: Full  Left Side Weight Bearing: As tolerated  Base of Support: Widened  Stance: Left decreased  Speed/Rose: Pace decreased (<100 feet/min); Slow  Step Length: Right shortened;Left shortened  Swing Pattern: Left asymmetrical;Right asymmetrical                 Stairs:               Therapeutic Exercises:   SUPINE  EXERCISES   Sets   Reps   Active Active Assist   Passive Self ROM   Comments   Ankle Pumps 1 5 [x]                                        []                                        []                                        []                                           Quad Sets 1 5 [x]                                        []                                        []                                        []                                           Heel Slides 1 5 [x]                                        []                                        []                                        []                                           Hip Abduction   []                                        []                                        []                                        []                                           Glut Sets   []                                        []                                        []                                        []                                              []                                        []                                        []                                        []                                              []                                        []                                        []                                        []                                             STANDING  EXERCISES   Sets   Reps   Active Active Assist   Passive Self ROM   Comments   Heel Raises 1 5 [x]                                        []                                        []                                        []                                           Hip Abduction   []                                        []                                        []                                        [] []                                        []                                        []                                        []                                              []                                        []                                        []                                        []                                             Pain Ratin/10    Activity Tolerance:   Good and requires rest breaks  Please refer to the flowsheet for vital signs taken during this treatment. After treatment patient left in no apparent distress:   Supine in bed, Heels elevated for pressure relief, Call bell within reach, and Bed / chair alarm activated    COMMUNICATION/COLLABORATION:   The patients plan of care was discussed with: Registered nurse.      Meryl Campos, PT   Time Calculation: 28 mins

## 2020-04-06 NOTE — PROGRESS NOTES
4/6/2020  3:27 PM  Reason for Admission: Emergency - Left femur fracture requiring LEFT FEMORAL INTERTROCHANTERIC INTRAMEDULLARY NAILING    RUR: 14%  Risk Level: [x]Low []Moderate []High  Value-based purchasing: [] Yes [x] No  Bundle patient: [] Yes [x] No   Specify:     Advance Directive: Not on file. Pt defers to . Assessment:    Age: 65    Sex: [] Male [x]Female     Residency: [x]Private residence []Apartment []Assisted Living []LTC []Other:   Exterior Steps: 3  Interior Steps: 0    Lives With: [x]With spouse []Other family members []Underage children []Alone []Care provider []Other:    Prior functioning:  [x]Independent []Dependent []Partial dependence   Pt requires assistance with: []Bathing []Dressing []Toileting []Ambulation     Prior DME required:  [x]None []RW []Cane []Crutches []Bedside commode []CPAP []Home O2 (Liter/Provider: ) []Nebulizer   []Shower Chair []Wheelchair []Hospital Bed []Chuckie []Stair lift []Rollator []Other:    DME available: []None [x]RW []Cane []Crutches []Bedside commode []CPAP []Home O2 (Liter/Provider: ) []Nebulizer   []Shower Chair []Wheelchair []Hospital Bed []Chuckie []Stair lift []Rollator []Other:    Rehab history: []None [x]Outpatient PT []Home Health (Provider/Date: ) []SNF (Provider/Date: ) []IPR (Provider/Date: ) []LTC (Provider/Date: )    Discharge Concerns: [x]Yes []No []Unknown   Describe: Pt prefers to go home in light of the COVID pandemic. Insurer: Monisha Moon. PCP: Nehal Roque MD   Name of Practice: [facility] Current patient: [x]Yes []No   Approximate date of last visit: 2+ years ago, but they have her records she reports. Pharmacy: [address] [facility] Transport: Family        Transition of care plan:    [x]Unable to determine at this time. Awaiting clinical progress, and disposition recommendations. Likely HH depending on progress.      [] Home with outpatient follow-up    [] Home with Outpatient PT and outpatient follow-up   Pt aware of OP appt? []Yes, Provider:   []Not scheduled   Transport provider:     [] Home with family assistance as needed and outpatient follow-up   Family able to assist:    Schedule:  Transport provider:      [] Home with Home Health   - Provider:     []SNF/IPR   -[]Preferences given:   []Listing provided and preferences requested   -Status: []Pending []Accepted:    -Auth required: []Yes []No    -Auth initiated date:   -3 midnight stay required: []Yes []No  Date satisfied:     [] Other:     Charlane Siemens, MA    Care Management Interventions  PCP Verified by CM: Yes(Gabriela. )  Mode of Transport at Discharge:  Other (see comment)(Family)  Transition of Care Consult (CM Consult): Discharge Planning  MyChart Signup: No  Discharge Durable Medical Equipment: No  Physical Therapy Consult: Yes  Occupational Therapy Consult: Yes  Speech Therapy Consult: No  Current Support Network: Lives with Spouse  Confirm Follow Up Transport: Family  Discharge Location  Discharge Placement: Unable to determine at this time

## 2020-04-06 NOTE — PROGRESS NOTES
1045: pt was orthostatic while working with PT. Systolic from 071 to 604 and pt reported feeling hot and flushed. Notified Beth Morales. He states no fluid bolus at his time, OK to work with pt this afternoon and to take orthostatics again at that time. Will put in H & H for this afternoon. 1930: Bedside shift change report given to Asad Noguera RN (oncoming nurse) by Marlene Cuellar RN (offgoing nurse). Report included the following information SBAR, Kardex and Recent Results.

## 2020-04-06 NOTE — PROGRESS NOTES
Nutrition Assessment:    RECOMMENDATIONS/INTERVENTION(S):   1. Continue regular diet. 2. Add Ensure HP 1x/d to promote adequate protein intake. 3. Continue to monitor intakes, wt changes, labs. ASSESSMENT:   4/6: RD consult received for general nutrition management/supplements. Pt admitted with hip fx, s/p repair surgery. BMI 33, c/w obesity. Pt reports okay appetite. PO intake 40% of oatmeal, granola, and Romanian toast for breakfast this AM. Pt say she was eating well PTA, 3 meals/day. Say she has been exercising more and limiting bread and sweets to lose weight. Thinks she had intentionally lost a couple #s recently. Discussed sources of protein and its importance following surgery. Pt receptive. Pt agreeable to trying Ensure 1x/d to promote protein intake. No c/o n/v/d/c. Labs- A1c 5.7, -169-594. Meds- os-felicitas, bowel regimen. Diet Order: Regular  % Eaten:    Patient Vitals for the past 72 hrs:   % Diet Eaten   04/06/20 0802 40 %       Pertinent Medications: [x] Reviewed    Labs: [x] Reviewed    Anthropometrics: Height: 5' 10\" (177.8 cm) Weight: 104.2 kg (229 lb 11.2 oz)    IBW (%IBW):   ( ) UBW (%UBW):   (  %)      BMI: Body mass index is 32.96 kg/m². This BMI is indicative of:   [] Underweight    [] Normal    [] Overweight    [x]  Obesity    []  Extreme Obesity (BMI>40)  Estimated Nutrition Needs (Based on): 2115 Kcals/day(1627 x 1.3 AF) , 104 g(1-1.2 g/kg) Protein  Carbohydrate: At Least 130 g/day  Fluids: 2115 mL/day (1 ml/kcal)    Last BM: 4/4 per pt    [x]Active     []Hyperactive  []Hypoactive       [] Absent   BS  Skin:    [] Intact   [x] Incision  [] Breakdown   [] DTI   [x] Tears/Excoriation/Abrasion  []Edema [] Other:      Wt Readings from Last 30 Encounters:   04/06/20 104.2 kg (229 lb 11.2 oz)   09/19/11 90.7 kg (200 lb)      NUTRITION DIAGNOSES:   Problem:  Increased nutrient needs     Etiology: related to increased demand for protein      Signs/Symptoms: as evidenced by hip fx s/p repair surgery      NUTRITION INTERVENTIONS:  Meals/Snacks: General/healthful diet   Supplements: Commercial supplement              GOAL:   PO intake >75% meals + ONS meeting estimated protein needs next 1-3 days    Cultural, Restorationist, or Ethnic Dietary Needs: None     EDUCATION & DISCHARGE NEEDS:    [x] None Identified   [] Identified and Education Provided/Documented   [] Identified and Pt declined/was not appropriate      [] Interdisciplinary Care Plan Reviewed/Documented    [x] Discharge Needs: regular diet   [] No Nutrition Related Discharge Needs    NUTRITION RISK:   Pt Is At Nutrition Risk  [x]     No Nutrition Risk Identified  []       PT SEEN FOR:    [x]  MD Consult: []Calorie Count      []Diabetic Diet Education        []Diet Education     []Electrolyte Management     [x]General Nutrition Management and Supplements     []Management of Tube Feeding     []TPN Recommendations    []  RN Referral:  []MST score >=2     []Enteral/Parenteral Nutrition PTA     []Pregnant: Gestational DM or Multigestation                 [] Pressure Ulcer    []  Low BMI      []  Length of Stay       [] Dysphagia Diet         [] Ventilator  []  Follow-up     Previous Recommendations:   [] Implemented          [] Not Implemented          [x] Not Applicable    Previous Goal:   [] Met              [] Progressing Towards Goal              [] Not Progressing Towards Goal   [x] Not Applicable            Gabriel Prabhakar [address] [facility]  Pager [phone]  Phone [phone]

## 2020-04-06 NOTE — PROGRESS NOTES
Problem: Mobility Impaired (Adult and Pediatric)  Goal: *Acute Goals and Plan of Care (Insert Text)  Description: FUNCTIONAL STATUS PRIOR TO ADMISSION: Patient was independent and active without use of DME.    HOME SUPPORT PRIOR TO ADMISSION: The patient lived with  but did not require assist.    Physical Therapy Goals  Initiated 4/6/2020    1. Patient will move from supine to sit and sit to supine  in bed with modified independence within 4 days. 2. Patient will perform sit to stand with modified independence within 4 days. 3. Patient will ambulate with supervision/set-up for 50 feet with the least restrictive device within 4 days. 4. Patient will ascend/descend 3 stairs with 1 handrail(s) with minimal assistance/contact guard assist within 4 days. 5. Patient will perform hip home exercise program per protocol with independence within 4 days. Outcome: Progressing Towards Goal   PHYSICAL THERAPY EVALUATION  Patient: Ruiz Webster (19 y.o. female)  Date: 4/6/2020  Primary Diagnosis: Femur fracture, left ([facility]) [S72.92XA]  Hip fracture ([address] [S72.009A]  Procedure(s) (LRB):  LEFT FEMORAL INTERTROCHANTERIC NAIL INSERTION (Left) 1 Day Post-Op   Precautions:   Fall, WBAT(short distances only)      ASSESSMENT  Based on the objective data described below, the patient presents with decreased mobility from baseline 2/2 L hip fracture s/p IT nail insertion POD#1. Patient limited by fear, weakness, decreased ROM, limited endurance and drop in BP with upright mobility. Close monitoring of BP with initial drop from SBP of 155mmHg to 115mmHg from supine to standing though stable for several minutes after transfer to the chair. Pt tolerated supine and sitting exercises well. Largely Mod A x 1 for sit<>stand and stand pivot to recliner chair with increased time, assist to move RW and verbal cueing to sequence. Pt diaphoretic but states that is normal for her with stress. RN aware and in agreement to monitor.  Pt hopeful to discharge home. Current Level of Function Impacting Discharge (mobility/balance): Mod A x 1    Functional Outcome Measure: The patient scored 4/28 on the Tinetti outcome measure which is indicative of high fall risk. Other factors to consider for discharge: pain control, ability to climb 3 steps     Patient will benefit from skilled therapy intervention to address the above noted impairments. PLAN :  Recommendations and Planned Interventions: bed mobility training, transfer training, gait training, therapeutic exercises, neuromuscular re-education, patient and family training/education, and therapeutic activities      Frequency/Duration: Patient will be followed by physical therapy:  twice daily to address goals. Recommendation for discharge: (in order for the patient to meet his/her long term goals)  Physical therapy at least 2 days/week in the home AND ensure assist and/or supervision for safety with mobility and ADLs     This discharge recommendation:  Has been made in collaboration with the attending provider and/or case management    IF patient discharges home will need the following DME: bedside commode and wheelchair         SUBJECTIVE:   Patient stated I'm sorry I am not doing better.     OBJECTIVE DATA SUMMARY:   HISTORY:    History reviewed. No pertinent past medical history. History reviewed. No pertinent surgical history.     Personal factors and/or comorbidities impacting plan of care: none    Home Situation  Home Environment: Private residence  # Steps to Enter: 3  Rails to Enter: Yes  Hand Rails : Bilateral(too wide)  One/Two Story Residence: Split level  Living Alone: No  Support Systems: Spouse/Significant Other/Partner  Patient Expects to be Discharged to[de-identified] Private residence  Current DME Used/Available at Home: Walker, rolling  Tub or Shower Type: Shower    EXAMINATION/PRESENTATION/DECISION MAKING:   Critical Behavior:  Neurologic State: Alert  Orientation Level: Oriented X4  Cognition: Appropriate decision making, Appropriate for age attention/concentration, Appropriate safety awareness, Follows commands     Hearing: Auditory  Auditory Impairment: None  Skin:    Edema:   Range Of Motion:  AROM: Generally decreased, functional           PROM: Generally decreased, functional           Strength:    Strength: Generally decreased, functional                    Tone & Sensation:   Tone: Normal              Sensation: Intact               Coordination:  Coordination: Within functional limits  Vision:      Functional Mobility:  Bed Mobility:  Rolling: Minimum assistance  Supine to Sit: Minimum assistance;Assist x1;Additional time     Scooting: Minimum assistance  Transfers:  Sit to Stand: Moderate assistance;Assist x1;Additional time  Stand to Sit: Minimum assistance;Assist x1;Additional time        Bed to Chair: Moderate assistance;Assist x1              Balance:   Sitting: Intact  Standing: Impaired  Standing - Static: Fair;Constant support  Standing - Dynamic : Fair;Constant support  Ambulation/Gait Training:                    Right Side Weight Bearing: Full  Left Side Weight Bearing: As tolerated                                 Stairs: Therapeutic Exercises:    Ankle pumps, heel sets, quad sets, LAQ    Functional Measure:  Tinetti test:    Sitting Balance: 1  Arises: 0  Attempts to Rise: 0  Immediate Standing Balance: 1  Standing Balance: 1  Nudged: 0  Eyes Closed: 0  Turn 360 Degrees - Continuous/Discontinuous: 0  Turn 360 Degrees - Steady/Unsteady: 0  Sitting Down: 1  Balance Score: 4 Balance total score  Indication of Gait: 0  R Step Length/Height: 0  L Step Length/Height: 0  R Foot Clearance: 0  L Foot Clearance: 0  Step Symmetry: 0  Step Continuity: 0  Path: 0  Trunk: 0  Walking Time: 0  Gait Score: 0 Gait total score  Total Score: 4/28 Overall total score         Tinetti Tool Score Risk of Falls  <19 = High Fall Risk  19-24 = Moderate Fall Risk  25-28 = Low Fall Risk  Kristina ROBBINS. Performance-Oriented Assessment of Mobility Problems in Elderly Patients. [address]; [id number]. (Scoring Description: PT Bulletin Feb. 10, 1993)    Older adults: Bertha Georges et al, 2009; n = 1000 [facility] elderly evaluated with ABC, MARLO, ADL, and IADL)  · Mean MARLO score for males aged 69-68 years = 26.21(3.40)  · Mean MARLO score for females age 69-68 years = 25.16(4.30)  · Mean MARLO score for males over 80 years = 23.29(6.02)  · Mean MARLO score for females over 80 years = 17.20(8.32)            Physical Therapy Evaluation Charge Determination   History Examination Presentation Decision-Making   MEDIUM  Complexity : 1-2 comorbidities / personal factors will impact the outcome/ POC  MEDIUM Complexity : 3 Standardized tests and measures addressing body structure, function, activity limitation and / or participation in recreation  LOW Complexity : Stable, uncomplicated  Other outcome measures Tinetti  LOW       Based on the above components, the patient evaluation is determined to be of the following complexity level: LOW     Pain Rating:  3/10    Activity Tolerance:   Good, SpO2 stable on RA, and requires rest breaks  Please refer to the flowsheet for vital signs taken during this treatment. After treatment patient left in no apparent distress:   Sitting in chair, Heels elevated for pressure relief, and Call bell within reach    COMMUNICATION/EDUCATION:   The patients plan of care was discussed with: Registered nurse. Fall prevention education was provided and the patient/caregiver indicated understanding., Patient/family have participated as able in goal setting and plan of care. , and Patient/family agree to work toward stated goals and plan of care.     Thank you for this referral.  Megan Baumgarten, PT   Time Calculation: 42 mins

## 2020-04-07 LAB
ANION GAP SERPL CALC-SCNC: 3 MMOL/L (ref 5–15)
BASOPHILS # BLD: 0 K/UL (ref 0–0.1)
BASOPHILS NFR BLD: 1 % (ref 0–1)
BUN SERPL-MCNC: 21 MG/DL (ref 6–20)
BUN/CREAT SERPL: 41 (ref 12–20)
CALCIUM SERPL-MCNC: 8.6 MG/DL (ref 8.5–10.1)
CHLORIDE SERPL-SCNC: 110 MMOL/L (ref 97–108)
CO2 SERPL-SCNC: 27 MMOL/L (ref 21–32)
CREAT SERPL-MCNC: 0.51 MG/DL (ref 0.55–1.02)
DIFFERENTIAL METHOD BLD: ABNORMAL
EOSINOPHIL # BLD: 0.2 K/UL (ref 0–0.4)
EOSINOPHIL NFR BLD: 3 % (ref 0–7)
ERYTHROCYTE [DISTWIDTH] IN BLOOD BY AUTOMATED COUNT: 13.3 % (ref 11.5–14.5)
GLUCOSE SERPL-MCNC: 107 MG/DL (ref 65–100)
HCT VFR BLD AUTO: 24.6 % (ref 35–47)
HGB BLD-MCNC: 8 G/DL (ref 11.5–16)
IMM GRANULOCYTES # BLD AUTO: 0.1 K/UL (ref 0–0.04)
IMM GRANULOCYTES NFR BLD AUTO: 1 % (ref 0–0.5)
LYMPHOCYTES # BLD: 1.5 K/UL (ref 0.8–3.5)
LYMPHOCYTES NFR BLD: 24 % (ref 12–49)
MCH RBC QN AUTO: 30.8 PG (ref 26–34)
MCHC RBC AUTO-ENTMCNC: 32.5 G/DL (ref 30–36.5)
MCV RBC AUTO: 94.6 FL (ref 80–99)
MONOCYTES # BLD: 0.6 K/UL (ref 0–1)
MONOCYTES NFR BLD: 9 % (ref 5–13)
NEUTS SEG # BLD: 3.9 K/UL (ref 1.8–8)
NEUTS SEG NFR BLD: 62 % (ref 32–75)
NRBC # BLD: 0 K/UL (ref 0–0.01)
NRBC BLD-RTO: 0 PER 100 WBC
PLATELET # BLD AUTO: 97 K/UL (ref 150–400)
PMV BLD AUTO: 11.4 FL (ref 8.9–12.9)
POTASSIUM SERPL-SCNC: 3.6 MMOL/L (ref 3.5–5.1)
RBC # BLD AUTO: 2.6 M/UL (ref 3.8–5.2)
SODIUM SERPL-SCNC: 140 MMOL/L (ref 136–145)
WBC # BLD AUTO: 6.2 K/UL (ref 3.6–11)

## 2020-04-07 PROCEDURE — 85025 COMPLETE CBC W/AUTO DIFF WBC: CPT

## 2020-04-07 PROCEDURE — 97116 GAIT TRAINING THERAPY: CPT

## 2020-04-07 PROCEDURE — 74011250637 HC RX REV CODE- 250/637: Performed by: INTERNAL MEDICINE

## 2020-04-07 PROCEDURE — 74011250636 HC RX REV CODE- 250/636: Performed by: ORTHOPAEDIC SURGERY

## 2020-04-07 PROCEDURE — 74011250637 HC RX REV CODE- 250/637: Performed by: ORTHOPAEDIC SURGERY

## 2020-04-07 PROCEDURE — 65270000029 HC RM PRIVATE

## 2020-04-07 PROCEDURE — 97542 WHEELCHAIR MNGMENT TRAINING: CPT

## 2020-04-07 PROCEDURE — 74011250636 HC RX REV CODE- 250/636: Performed by: INTERNAL MEDICINE

## 2020-04-07 PROCEDURE — 36415 COLL VENOUS BLD VENIPUNCTURE: CPT

## 2020-04-07 PROCEDURE — 97535 SELF CARE MNGMENT TRAINING: CPT

## 2020-04-07 PROCEDURE — 97110 THERAPEUTIC EXERCISES: CPT

## 2020-04-07 PROCEDURE — 80048 BASIC METABOLIC PNL TOTAL CA: CPT

## 2020-04-07 PROCEDURE — 97530 THERAPEUTIC ACTIVITIES: CPT

## 2020-04-07 RX ORDER — OXYCODONE AND ACETAMINOPHEN 5; 325 MG/1; MG/1
0.5 TABLET ORAL
Qty: 30 TAB | Refills: 0 | Status: SHIPPED | OUTPATIENT
Start: 2020-04-07 | End: 2020-04-10

## 2020-04-07 RX ORDER — FERROUS SULFATE, DRIED 160(50) MG
1 TABLET, EXTENDED RELEASE ORAL
Qty: 90 TAB | Refills: 0 | Status: SHIPPED | OUTPATIENT
Start: 2020-04-07

## 2020-04-07 RX ORDER — ENOXAPARIN SODIUM 100 MG/ML
40 INJECTION SUBCUTANEOUS EVERY 24 HOURS
Qty: 11.2 ML | Refills: 0 | Status: SHIPPED | OUTPATIENT
Start: 2020-04-07 | End: 2020-05-05

## 2020-04-07 RX ORDER — POTASSIUM CHLORIDE 750 MG/1
40 TABLET, FILM COATED, EXTENDED RELEASE ORAL
Status: COMPLETED | OUTPATIENT
Start: 2020-04-07 | End: 2020-04-07

## 2020-04-07 RX ORDER — OXYCODONE AND ACETAMINOPHEN 5; 325 MG/1; MG/1
0.5 TABLET ORAL
Status: DISCONTINUED | OUTPATIENT
Start: 2020-04-07 | End: 2020-04-08 | Stop reason: HOSPADM

## 2020-04-07 RX ORDER — OXYCODONE AND ACETAMINOPHEN 5; 325 MG/1; MG/1
1 TABLET ORAL
Qty: 30 TAB | Refills: 0 | Status: SHIPPED | OUTPATIENT
Start: 2020-04-07 | End: 2020-04-07

## 2020-04-07 RX ADMIN — Medication 10 ML: at 06:38

## 2020-04-07 RX ADMIN — Medication 10 ML: at 21:54

## 2020-04-07 RX ADMIN — SENNOSIDES AND DOCUSATE SODIUM 1 TABLET: 8.6; 5 TABLET ORAL at 08:12

## 2020-04-07 RX ADMIN — ENOXAPARIN SODIUM 40 MG: 40 INJECTION SUBCUTANEOUS at 21:51

## 2020-04-07 RX ADMIN — POLYETHYLENE GLYCOL 3350 17 G: 17 POWDER, FOR SOLUTION ORAL at 08:12

## 2020-04-07 RX ADMIN — Medication 10 ML: at 15:05

## 2020-04-07 RX ADMIN — OXYCODONE HYDROCHLORIDE AND ACETAMINOPHEN 0.5 TABLET: 5; 325 TABLET ORAL at 09:15

## 2020-04-07 RX ADMIN — OXYCODONE HYDROCHLORIDE AND ACETAMINOPHEN 0.5 TABLET: 5; 325 TABLET ORAL at 21:52

## 2020-04-07 RX ADMIN — ACETAMINOPHEN 650 MG: 325 TABLET ORAL at 18:37

## 2020-04-07 RX ADMIN — CETIRIZINE HYDROCHLORIDE 5 MG: 10 TABLET, FILM COATED ORAL at 08:12

## 2020-04-07 RX ADMIN — HYDROMORPHONE HYDROCHLORIDE 1 MG: 2 INJECTION, SOLUTION INTRAMUSCULAR; INTRAVENOUS; SUBCUTANEOUS at 03:40

## 2020-04-07 RX ADMIN — OXYCODONE HYDROCHLORIDE AND ACETAMINOPHEN 0.5 TABLET: 5; 325 TABLET ORAL at 15:28

## 2020-04-07 RX ADMIN — IRON SUCROSE 100 MG: 20 INJECTION, SOLUTION INTRAVENOUS at 08:20

## 2020-04-07 RX ADMIN — ACETAMINOPHEN 650 MG: 325 TABLET ORAL at 11:21

## 2020-04-07 RX ADMIN — CETIRIZINE HYDROCHLORIDE 5 MG: 10 TABLET, FILM COATED ORAL at 21:52

## 2020-04-07 RX ADMIN — OYSTER SHELL CALCIUM WITH VITAMIN D 1 TABLET: 500; 200 TABLET, FILM COATED ORAL at 16:58

## 2020-04-07 RX ADMIN — OYSTER SHELL CALCIUM WITH VITAMIN D 1 TABLET: 500; 200 TABLET, FILM COATED ORAL at 08:20

## 2020-04-07 RX ADMIN — POTASSIUM CHLORIDE 40 MEQ: 750 TABLET, FILM COATED, EXTENDED RELEASE ORAL at 08:20

## 2020-04-07 RX ADMIN — SENNOSIDES AND DOCUSATE SODIUM 1 TABLET: 8.6; 5 TABLET ORAL at 18:37

## 2020-04-07 RX ADMIN — ACETAMINOPHEN 650 MG: 325 TABLET ORAL at 06:38

## 2020-04-07 RX ADMIN — OXYCODONE HYDROCHLORIDE AND ACETAMINOPHEN 0.5 TABLET: 5; 325 TABLET ORAL at 13:14

## 2020-04-07 RX ADMIN — OYSTER SHELL CALCIUM WITH VITAMIN D 1 TABLET: 500; 200 TABLET, FILM COATED ORAL at 11:21

## 2020-04-07 NOTE — PROGRESS NOTES
4/7/2020     4:30 PM  Wheelchair will be delivered to pt's room tomorrow through [facility]. 3:41 PM  RUR:  15%  Risk Level: [x]Low []Moderate []High  Value-based purchasing: [] Yes [x] No  Bundle patient: [x] Yes [] No   Specify:  Ortho Hip Fracture Bundle    Transition of care plan:  1. Medical clearance and DC order. 2. Home with [facility] with pt preference, At [address]. Referral for wheelchair submitted, and awaiting response. 3. Outpatient follow-up. 4. Pt's family to transport. Care Management Interventions  PCP Verified by CM: Yes(Gabriela. )  Mode of Transport at Discharge:  Other (see comment)(Family)  Transition of Care Consult (CM Consult): Discharge Planning  MyChart Signup: No  Discharge Durable Medical Equipment: No  Physical Therapy Consult: Yes  Occupational Therapy Consult: Yes  Speech Therapy Consult: No  Current Support Network: Lives with Spouse  Confirm Follow Up Transport: Family  The Plan for Transition of Care is Related to the Following Treatment Goals : Hip fracture  The Patient and/or Patient Representative was Provided with a Choice of Provider and Agrees with the Discharge Plan?: (S) Yes  Name of the Patient Representative Who was Provided with a Choice of Provider and Agrees with the Discharge Plan: Self  Freedom of Choice List was Provided with Basic Dialogue that Supports the Patient's Individualized Plan of Care/Goals, Treatment Preferences and Shares the Quality Data Associated with the Providers?: (S) Yes  Discharge Location  Discharge Placement: Home with home health

## 2020-04-07 NOTE — PROGRESS NOTES
Shayan Davis [facility]  [address] [facility], [address] [place] [address]  [phone]      Medical Progress Note      NAME: Seraifn Duenas   :  1950  MRM:  [id number]    Date/Time: 2020  10:43 AM         Subjective:     Chief Complaint:  \"I'm ok\"    Pt seen and examined. Worked with PT/OT who recommend home health services. Pt is reluctant to be discharged     ROS:  (bold if positive, if negative)             Objective:       Vitals:          Last 24hrs VS reviewed since prior progress note. Most recent are:    Visit Vitals  /76 (BP 1 Location: Right arm, BP Patient Position: Supine)   Pulse 90   Temp 98 °F (36.7 °C)   Resp 16   Ht 5' 10\" (1.778 m)   Wt 104.2 kg (229 lb 11.2 oz)   SpO2 90%   Breastfeeding No   BMI 32.96 kg/m²     SpO2 Readings from Last 6 Encounters:   20 90%   11 98%    O2 Flow Rate (L/min): 2 l/min       Intake/Output Summary (Last 24 hours) at 2020 1618  Last data filed at 2020 1311  Gross per 24 hour   Intake 1310 ml   Output 550 ml   Net 760 ml          Exam:     Physical Exam:    Gen:  Well-developed, well-nourished, in no acute distress  HEENT:  Pink conjunctivae, PERRL, hearing intact to voice, moist mucous membranes  Neck:  Supple, without masses, thyroid non-tender  Resp:  No accessory muscle use, clear breath sounds without wheezes rales or rhonchi  Card:  No murmurs, normal S1, S2 without thrills, bruits or peripheral edema  Abd:  Soft, non-tender, non-distended, normoactive bowel sounds are present  Musc:  No cyanosis or clubbing  Skin:  No rashes or ulcers, skin turgor is good  Neuro:  Cranial nerves 3-12 are grossly intact,  strength is 5/5 bilaterally and dorsi / plantarflexion is 5/5 bilaterally, follows commands appropriately  Psych:  Good insight, oriented to person, place and time, alert  L hip dressing C/D/I.      Medications Reviewed: (see below)    Lab Data Reviewed: (see below)    ______________________________________________________________________    Medications:     Current Facility-Administered Medications   Medication Dose Route Frequency    oxyCODONE-acetaminophen (PERCOCET) 5-325 mg per tablet 0.5 Tab  0.5 Tab Oral Q3H PRN    sodium chloride (NS) flush 5-40 mL  5-40 mL IntraVENous PRN    cetirizine (ZYRTEC) tablet 5 mg  5 mg Oral BID    HYDROmorphone (PF) (DILAUDID) injection 1 mg  1 mg IntraVENous Q3H PRN    sodium chloride (NS) flush 5-40 mL  5-40 mL IntraVENous Q8H    sodium chloride (NS) flush 5-40 mL  5-40 mL IntraVENous PRN    acetaminophen (TYLENOL) tablet 650 mg  650 mg Oral Q6H    naloxone (NARCAN) injection 0.4 mg  0.4 mg IntraVENous PRN    calcium-vitamin D (OS-ENRIQUETA) 500 mg-200 unit tablet  1 Tab Oral TID WITH MEALS    senna-docusate (PERICOLACE) 8.6-50 mg per tablet 1 Tab  1 Tab Oral BID    polyethylene glycol (MIRALAX) packet 17 g  17 g Oral DAILY    bisacodyL (DULCOLAX) suppository 10 mg  10 mg Rectal DAILY PRN    sodium chloride (NS) flush 5-40 mL  5-40 mL IntraVENous Q8H    sodium chloride (NS) flush 5-40 mL  5-40 mL IntraVENous PRN    acetaminophen (TYLENOL) tablet 650 mg  650 mg Oral Q4H PRN    enoxaparin (LOVENOX) injection 40 mg  40 mg SubCUTAneous Q24H    glucose chewable tablet 16 g  4 Tab Oral PRN    glucagon (GLUCAGEN) injection 1 mg  1 mg IntraMUSCular PRN    dextrose 10% infusion 0-250 mL  0-250 mL IntraVENous PRN    prochlorperazine (COMPAZINE) injection 10 mg  10 mg IntraVENous Q4H PRN            Lab Review:     Recent Labs     04/07/20  0148 04/06/20  1153 04/06/20  0122 04/05/20  0325   WBC 6.2  --  9.0 8.7   HGB 8.0* 8.8* 9.0* 12.2   HCT 24.6* 27.1* 27.5* 36.8   PLT 97*  --  124* 137*     Recent Labs     04/07/20  0148 04/06/20  0122 04/05/20  0325  04/04/20  1744    139 138  --  134*   K 3.6 4.2 3.9   < > 5.7*   * 108 106  --  108   CO2 27 27 26  --  17*   * 122* 165*  --  209*   BUN 21* 27* 22*  --  18 CREA 0.51* 0.66 0.53*  --  0.58   CA 8.6 8.7 9.1  --  9.3   MG  --   --  2.2  --   --    PHOS  --   --  4.9*  --   --    ALB  --   --   --   --  3.3*   SGOT  --   --   --   --  42*   ALT  --   --   --   --  27    < > = values in this interval not displayed. No components found for: [place]         Assessment / Plan:   Hip fracture ([facility] (4/4/2020) - s/p repair   -PT/OT on board  -ortho on board   -Lovenox x 28 days for DVT prophylaxis   -home in the AM if blood work stable        Leukocytosis (4/4/2020) - suspect acute stress reaction as UA and CXR WNL and resolved     Hyperkalemia - resolved without treatment        Hyperglycemia (4/4/2020) - A1c 5.7. No e/o DM   -STOP BG checks        Class 1 obesity in adult (4/4/2020)  -counseled on weight loss     Acute blood loss anemia - post surgical blood loss. Suspect appropriate.  Defer to ortho  -daily CBC; no e/o hematoma on exam of L hip  -iron sucrose                    Code status:              - patient is FULL CODE, no AMD on file,  Safia Mo is surrogate    Total time spent with patient: 32 [address] discussed with: Patient    Discussed:  Code Status, Care Plan and D/C Planning    Prophylaxis:  SCD's, Lovenox     Disposition:  Home with home health PT/OT            ___________________________________________________    Attending Physician: Nyla Fiore MD

## 2020-04-07 NOTE — PROGRESS NOTES
Problem: Falls - Risk of  Goal: *Absence of Falls  Description: Document Earma Terence Fall Risk and appropriate interventions in the flowsheet. Outcome: Progressing Towards Goal  Note: Fall Risk Interventions:  Mobility Interventions: Communicate number of staff needed for ambulation/transfer, OT consult for ADLs, PT Consult for mobility concerns         Medication Interventions: Patient to call before getting OOB, Teach patient to arise slowly    Elimination Interventions: Call light in reach    History of Falls Interventions: Investigate reason for fall, Utilize gait belt for transfer/ambulation         Problem: Patient Education: Go to Patient Education Activity  Goal: Patient/Family Education  Outcome: Progressing Towards Goal     Problem: Pressure Injury - Risk of  Goal: *Prevention of pressure injury  Description: Document Demetrio Scale and appropriate interventions in the flowsheet.   Outcome: Progressing Towards Goal  Note: Pressure Injury Interventions:       Moisture Interventions: Minimize layers, Maintain skin hydration (lotion/cream)    Activity Interventions: PT/OT evaluation, Increase time out of bed    Mobility Interventions: PT/OT evaluation    Nutrition Interventions: Document food/fluid/supplement intake, Offer support with meals,snacks and hydration    Friction and Shear Interventions: Minimize layers                Problem: Patient Education: Go to Patient Education Activity  Goal: Patient/Family Education  Outcome: Progressing Towards Goal     Problem: Patient Education: Go to Patient Education Activity  Goal: Patient/Family Education  Outcome: Progressing Towards Goal     Problem: Patient Education: Go to Patient Education Activity  Goal: Patient/Family Education  Outcome: Progressing Towards Goal

## 2020-04-07 NOTE — PROGRESS NOTES
Orthopaedic Progress Note  Post Op day: 2 Days Post-Op    2020 9:27 AM     Patient: Abdoulaye Bacon MRN: [id number]  SSN: [id number]    YOB: 1950  Age: 65 y.o. Sex: female      Admit date:  2020  Date of Surgery:  2020   Procedures:  Procedure(s):  LEFT FEMORAL INTERTROCHANTERIC NAIL INSERTION  Admitting Physician:  Candy Nguyen MD   Surgeon:  Andria Gutierrez) and Role:     Mikayla Chang MD - Primary    Consulting Physician(s): Treatment Team: Attending Provider: Corrinne Harman, MD; Consulting Provider: Christian Monroe MD; Primary Nurse: Marina Leahy; Utilization Review: Pari Valencia RN; Utilization Review: Betsy Veronica; Care Manager: Josie Brown    SUBJECTIVE:     Abdoulaye Bacon is a 65 y.o. female is 2 Days Post-Op s/p Procedure(s):  LEFT FEMORAL INTERTROCHANTERIC NAIL INSERTION with an appropriate level of post-operative pain. No complaints of nausea, vomiting, dizziness, lightheadedness, chest pain, or shortness of breath. OBJECTIVE:       Physical Exam:  General: Alert, cooperative, no distress. Respiratory: Respirations unlabored  Neurological:  Neurovascular exam within normal limits. Motor: + DF/PF. Musculoskeletal: Calves soft, supple, non-tender upon palpation. Dressing/Wound:  Clean, dry and intact. No significant erythema or swelling.       Vital Signs:        Patient Vitals for the past 8 hrs:   BP Temp Pulse Resp SpO2   20 0753 115/64 98.5 °F (36.9 °C) 100 16 92 %   20 0400 117/72 98 °F (36.7 °C) 75 14 93 %                                          Temp (24hrs), Av.2 °F (36.8 °C), Min:97.8 °F (36.6 °C), Max:98.5 °F (36.9 °C)      Labs:        Recent Labs     20  0148   HCT 24.6*   HGB 8.0*     Lab Results   Component Value Date/Time    Sodium 140 2020 01:48 AM    Potassium 3.6 2020 01:48 AM    Chloride 110 (H) 2020 01:48 AM    CO2 27 2020 01:48 AM    Glucose 107 (H) 2020 01:48 AM    BUN 21 (H) 04/07/2020 01:48 AM    Creatinine 0.51 (L) 04/07/2020 01:48 AM    Calcium 8.6 04/07/2020 01:48 AM       PT/OT:        Gait  Base of Support: Widened  Speed/Rose: Pace decreased (<100 feet/min), Slow  Step Length: Right shortened, Left shortened  Swing Pattern: Left asymmetrical, Right asymmetrical  Stance: Left decreased  Gait Abnormalities: Antalgic, Decreased step clearance, Step to gait  Ambulation - Level of Assistance: Minimal assistance, Assist x1, Additional time  Distance (ft): 15 Feet (ft)  Assistive Device: Gait belt, Walker, rolling       Patient mobility  Bed Mobility Training  Rolling: Minimum assistance  Supine to Sit: Minimum assistance, Assist x1, Additional time  Sit to Supine: Minimum assistance  Scooting: Minimum assistance  Transfer Training  Sit to Stand: Minimum assistance, Assist x1, Additional time  Stand to Sit: Minimum assistance, Assist x1, Additional time  Bed to Chair: Moderate assistance, Assist x1, Additional time      Gait Training  Assistive Device: Gait belt, Walker, rolling  Ambulation - Level of Assistance: Minimal assistance, Assist x1, Additional time  Distance (ft): 15 Feet (ft)   Weight Bearing Status  Right Side Weight Bearing: Full  Left Side Weight Bearing: As tolerated        ASSESSMENT / PLAN:   Principal Problem:    Hip fracture ([address] (4/4/2020)    Active Problems:    Leukocytosis (4/4/2020)      Hyperkalemia (4/4/2020)      Hyperglycemia (4/4/2020)      Class 1 obesity in adult (4/4/2020)                    Pain Control: Adequate with oral agents    DVT Prophylaxis: Lovenox daily, SCDs    Therapy/ Weight bearing: WBAT   Wound/ incisional issues: C, D & I   Medical concerns:    Disposition: Home w/ Home PT when cleared by therapy. Outpatient f/u in 10-14 days with Dr. Cory Berry.      Signed By:  Augustin Perez PA-C    Orthopedic Surgery   [address] [place] [address]

## 2020-04-07 NOTE — PROGRESS NOTES
Spiritual Care Assessment/Progress Note  [employer]      NAME: Brielle Julio      MRN: [id number]  AGE: 65 y.o.  SEX: female  Adventist Affiliation: Song   Language: English     4/7/2020     Total Time (in minutes): 20     Spiritual Assessment begun in [facility] POST SURG ORT 1 through conversation with:         [x]Patient        [] Family    [] Friend(s)        Reason for Consult: Initial/Spiritual assessment, patient floor     Spiritual beliefs: (Please include comment if needed)     [x] Identifies with a eliecer tradition: Song         [x] Supported by a eliecer community:            [] Claims no spiritual orientation:           [] Seeking spiritual identity:                [] Adheres to an individual form of spirituality:           [] Not able to assess:                           Identified resources for coping:      [x] Prayer                               [] Music                  [] Guided Imagery     [x] Family/friends                 [] Pet visits     [x] Devotional reading                         [] Unknown     [] Other:                                           Interventions offered during this visit: (See comments for more details)    Patient Interventions: Affirmation of emotions/emotional suffering, Catharsis/review of pertinent events in supportive environment, Coping skills reviewed/reinforced, Prayer (actual), Normalization of emotional/spiritual concerns           Plan of Care:     [] Support spiritual and/or cultural needs    [] Support AMD and/or advance care planning process      [] Support grieving process   [] Coordinate Rites and/or Rituals    [] Coordination with community clergy   [] No spiritual needs identified at this time   [] Detailed Plan of Care below (See Comments)  [] Make referral to Music Therapy  [] Make referral to Pet Therapy     [] Make referral to Addiction services  [] Make referral to [facility]  [] Make referral to Spiritual Care Partner  [] No future visits requested        [x] Follow up visits as needed     Comments:  visited Mrs. Paul Linares for an initial spiritual assessment on the Post. Surgical Ortho unit. Mrs. Palu Linares was awake, alert, and sitting up in her recliner when the  came into the room. She greeted the  warmly and made good eye contact.  listened intently as Mrs Paul Linares engaged in illness review and reflection. She spoke about how difficult being in the hospital has been for her, especially with the current restrictions in place. She greatly missed her  and although they talk regularly, it is much different than seeing him and having him here in person. Mrs Paul Linares also seems to be well supported, and spoke about numerous friends and family members who have called and checked in on her at the hospital. Vee Newton is also an important coping resource for her and she shared how her  was being very intentional about remaining connected to the [employer] by sending emails and doing a weekly service to be viewed online. This has brought a sense of normalcy and connection during this difficult time. Mrs. Paul Linares requested prayer and  prayed at bedside. She thanked the  for stopping by and is aware of the 's availability.  will follow up as able and/or needed  Conisus. Carmela Feliciano.      Paging Service: [phone])

## 2020-04-07 NOTE — PROGRESS NOTES
1530; Pt. still reporting 5/10 pain. Per Dr. Long Sensing OK to give percocet early. 1545: Bedside shift change report given to Easton Chew RN (oncoming nurse) by Rox Gonzáles RN (offgoing nurse). Report included the following information SBAR, Kardex, Procedure Summary and Recent Results.

## 2020-04-07 NOTE — PROGRESS NOTES
Problem: Self Care Deficits Care Plan (Adult)  Goal: *Acute Goals and Plan of Care (Insert Text)  Description: FUNCTIONAL STATUS PRIOR TO ADMISSION: Patient was independent and active without use of DME.    HOME SUPPORT: The patient lived with  but did not require assist.    Occupational Therapy Goals  Initiated 4/6/2020     1. Patient will perform lower body dressing using Reacher and Stocking Aid at modified independence level within 7 days. 2. Patient will perform toilet transfers at contact-guard assist level using RW and grab bars prn within 7 days. 3. Patient will perform all aspects of toileting at modified independence level using RW and grab bars prn within 7 days. 4. Patient will demonstrate 3/3 hip precautions without cues within 7 days. Outcome: Progressing Towards Goal    OCCUPATIONAL THERAPY TREATMENT  Patient: Rhonda Guerrero (19 y.o. female)  Date: 4/7/2020  Diagnosis: Femur fracture, left ([facility]) [S72.92XA]  Hip fracture ([address] [S72.009A]   Hip fracture ([address]  Procedure(s) (LRB):  LEFT FEMORAL INTERTROCHANTERIC NAIL INSERTION (Left) 2 Days Post-Op  Precautions: Fall, WBAT(short distance only)  Chart, occupational therapy assessment, plan of care, and goals were reviewed. ASSESSMENT  Patient continues with skilled OT services and is progressing towards goals. Pt received sitting upright in chair, receptive to participating in therapy. Pt with improved functional mobility today. With RW, additional time, and min assist, pt able to ambulate several steps to bariatric BS. Pt educated on safe transfer techniques for new commode, able to demonstrate skill successfully with verbal cues and min assist for controlling sit. Pt continues to have difficulty with seated toilet hygiene although improved with change to bariatric BSC - pt receptive to suggestions.  Pt requires significant amount of time to complete transfers and toileting, reported fatigue afterwards but no reports of lightheadedness this session. Current Level of Function Impacting Discharge (ADLs): RW and min assist x1 for transfers to [facility]    Other factors to consider for discharge: lives with  who can assist prn         PLAN :  Patient continues to benefit from skilled intervention to address the above impairments. Continue treatment per established plan of care. to address goals. Recommend with staff: Recommend with nursing patient to complete as able in order to maintain strength, endurance and independence: standing ADLs with RW/CGA/setup and OOB to chair 3x/day and mobilizing to the [facility] for toileting with [address] assist. Thank you for your assistance. Recommend next OT session: continue practice with toileting and toilet transfer; standing ADLs; activity tolerance    Recommendation for discharge: (in order for the patient to meet his/her long term goals)  Occupational therapy at least 2 days/week in the home AND ensure assist and/or supervision for safety with functional mobility and ADL completion     This discharge recommendation:  Has been made in collaboration with the attending provider and/or case management    IF patient discharges home will need the following DME: TBD (may benefit from safety frame for toilet or BSC)       SUBJECTIVE:   Patient stated I think it's easier to clean myself on this one,\" referring to bariatric BSC as opposed to standard BSC. OBJECTIVE DATA SUMMARY:   Cognitive/Behavioral Status:  Neurologic State: Alert; Appropriate for age  Orientation Level: Oriented X4  Cognition: Appropriate decision making; Appropriate for age attention/concentration; Appropriate safety awareness; Follows commands  Perception: Appears intact  Perseveration: No perseveration noted  Safety/Judgement: Awareness of environment; Fall prevention;Good awareness of safety precautions; Insight into deficits    Functional Mobility and Transfers for ADLs:  Bed Mobility:  Scooting: Minimum assistance; Additional time;Assist x1    Transfers:  Sit to Stand: Minimum assistance; Additional time;Assist x1  Functional Transfers  Bathroom Mobility: Minimum assistance  Toilet Transfer : Minimum assistance; Additional time;Assist x1       Balance:  Sitting: Intact  Standing: With support; Impaired  Standing - Static: Good;Constant support  Standing - Dynamic : Fair;Constant support    ADL Intervention:     Toileting  Toileting Assistance: Minimum assistance;Set-up  Clothing Management: Minimum assistance  Cues: Verbal cues provided;Visual cues provided    Cognitive Retraining  Safety/Judgement: Awareness of environment; Fall prevention;Good awareness of safety precautions; Insight into deficits      Pain:  Pt received pain medications prior to session. Pt reporting pain of approx 7 in L hip. Activity Tolerance:   Good  Please refer to the flowsheet for vital signs taken during this treatment. After treatment patient left in no apparent distress:   Sitting in chair and Call bell within reach    COMMUNICATION/COLLABORATION:   The patients plan of care was discussed with: Physical therapist and Registered nurse.      Josse Newman OT  Time Calculation: 29 mins

## 2020-04-07 NOTE — PROGRESS NOTES
Problem: Mobility Impaired (Adult and Pediatric)  Goal: *Acute Goals and Plan of Care (Insert Text)  Description: FUNCTIONAL STATUS PRIOR TO ADMISSION: Patient was independent and active without use of DME.    HOME SUPPORT PRIOR TO ADMISSION: The patient lived with  but did not require assist.    Physical Therapy Goals  Initiated 4/6/2020    1. Patient will move from supine to sit and sit to supine  in bed with modified independence within 4 days. 2. Patient will perform sit to stand with modified independence within 4 days. 3. Patient will ambulate with supervision/set-up for 50 feet with the least restrictive device within 4 days. 4. Patient will ascend/descend 3 stairs with 1 handrail(s) with minimal assistance/contact guard assist within 4 days. 5. Patient will perform hip home exercise program per protocol with independence within 4 days. 4/7/2020 1542 by [address] PT  Outcome: Progressing Towards Goal   PHYSICAL THERAPY TREATMENT  Patient: Robb Lizarraga (65 y.o. female)  Date: 4/7/2020  Diagnosis: Femur fracture, left ([facility]) [S72.92XA]  Hip fracture ([address] [S72.009A]   Hip fracture ([address]  Procedure(s) (LRB):  LEFT FEMORAL INTERTROCHANTERIC NAIL INSERTION (Left) 2 Days Post-Op  Precautions: Fall, WBAT(short distance only)  Chart, physical therapy assessment, plan of care and goals were reviewed. ASSESSMENT  Patient continues with skilled PT services and is progressing towards goals. Patient continues to be down on herself and needs consistent encouragement that she is progressing well and ahead of most. Patient requires Min A for bed mobility, transfers to standing and assist with ADLs. Gait limited by increased pain and decreased step clearance on the R. Appears to have slight leg length discrepancy with LLE shorter than the RLE in stance. Began w/c management training this afternoon as well as completed one step with bilateral railings and Min A x 2.  Pt will need to do three steps with a single railing prior to discharge. Returned to supine at end of session. Current Level of Function Impacting Discharge (mobility/balance): pain control and lethargy    Other factors to consider for discharge: steps to enter house, Hgb 8.0         PLAN :  Patient continues to benefit from skilled intervention to address the above impairments. Continue treatment per established plan of care. to address goals. Recommendation for discharge: (in order for the patient to meet his/her long term goals)  Physical therapy at least 2 days/week in the home     This discharge recommendation:  Has been made in collaboration with the attending provider and/or case management    IF patient discharges home will need the following DME: bedside commode and wheelchair       SUBJECTIVE:   Patient stated Bessie Gonzales will try to stop being a eboni downer.     OBJECTIVE DATA SUMMARY:   Critical Behavior:  Neurologic State: Alert, Appropriate for age  Orientation Level: Oriented X4  Cognition: Appropriate decision making, Appropriate for age attention/concentration, Appropriate safety awareness, Follows commands  Safety/Judgement: Awareness of environment, Fall prevention, Good awareness of safety precautions, Insight into deficits  Functional Mobility Training:  Bed Mobility:     Supine to Sit: Minimum assistance  Sit to Supine: Minimum assistance  Scooting: Minimum assistance        Transfers:  Sit to Stand: Minimum assistance;Assist x1  Stand to Sit: Contact guard assistance                             Balance:  Sitting: Intact  Standing: Intact; With support  Standing - Static: Good;Constant support  Standing - Dynamic : Good;Constant support  Ambulation/Gait Training:  Distance (ft): 35 Feet (ft)  Assistive Device: Gait belt;Walker, rolling  Ambulation - Level of Assistance: Minimal assistance;Assist x1        Gait Abnormalities: Antalgic;Decreased step clearance; Step to gait        Base of Support: Widened  Stance: Left decreased  Speed/Rose: Pace decreased (<100 feet/min); Slow  Step Length: Right shortened  Swing Pattern: Left asymmetrical                 Stairs:  Number of Stairs Trained: 1  Stairs - Level of Assistance: Minimum assistance;Assist X2   Rail Use: Both      Activity Tolerance:   Fair  Please refer to the flowsheet for vital signs taken during this treatment. After treatment patient left in no apparent distress:   Supine in bed and Call bell within reach    COMMUNICATION/COLLABORATION:   The patients plan of care was discussed with: Registered nurse.      Kathrin Islas, PT   Time Calculation: 51 mins

## 2020-04-07 NOTE — PROGRESS NOTES
Problem: Mobility Impaired (Adult and Pediatric)  Goal: *Acute Goals and Plan of Care (Insert Text)  Description: FUNCTIONAL STATUS PRIOR TO ADMISSION: Patient was independent and active without use of DME.    HOME SUPPORT PRIOR TO ADMISSION: The patient lived with  but did not require assist.    Physical Therapy Goals  Initiated 4/6/2020    1. Patient will move from supine to sit and sit to supine  in bed with modified independence within 4 days. 2. Patient will perform sit to stand with modified independence within 4 days. 3. Patient will ambulate with supervision/set-up for 50 feet with the least restrictive device within 4 days. 4. Patient will ascend/descend 3 stairs with 1 handrail(s) with minimal assistance/contact guard assist within 4 days. 5. Patient will perform hip home exercise program per protocol with independence within 4 days. Outcome: Progressing Towards Goal   PHYSICAL THERAPY TREATMENT  Patient: Chris Koehler (13 y.o. female)  Date: 4/7/2020  Diagnosis: Femur fracture, left ([facility]) [S72.92XA]  Hip fracture ([address] [S72.009A]   Hip fracture ([facility]  Procedure(s) (LRB):  LEFT FEMORAL INTERTROCHANTERIC NAIL INSERTION (Left) 2 Days Post-Op  Precautions: Fall, WBAT(short distance only)  Chart, physical therapy assessment, plan of care and goals were reviewed. ASSESSMENT  Patient continues with skilled PT services and is progressing towards goals. Patient's only complaint at this time is drowsiness from medication. Mobilizing well with Min A at most from sit>stand transfer d/t decreased confidence. Pt ambulated 40ft with RW and proper sequencing following initial verbal cues. Improved ability to accept weight onto LLE and clear bilateral feet from the ground. Will begin to initiate stair training this afternoon in preparation for discharge home. Pt would benefit from wheelchair rental for home use d/t gait distance restrictions.     Current Level of Function Impacting Discharge (mobility/balance): Min A for short distances and decreased confidence    Other factors to consider for discharge: 3 steps to enter home         PLAN :  Patient continues to benefit from skilled intervention to address the above impairments. Continue treatment per established plan of care. to address goals. Recommendation for discharge: (in order for the patient to meet his/her long term goals)  Physical therapy at least 2 days/week in the home     This discharge recommendation:  Has been made in collaboration with the attending provider and/or case management    IF patient discharges home will need the following DME: wheelchair       SUBJECTIVE:   Patient stated Im afraid my  can't do all this lifting.     OBJECTIVE DATA SUMMARY:   Critical Behavior:  Neurologic State: Alert, Appropriate for age  Orientation Level: Oriented X4  Cognition: Appropriate decision making, Appropriate for age attention/concentration, Appropriate safety awareness, Follows commands  Safety/Judgement: Awareness of environment, Fall prevention, Good awareness of safety precautions, Insight into deficits  Functional Mobility Training:  Bed Mobility:           Scooting: Minimum assistance; Additional time;Assist x1        Transfers:  Sit to Stand: Minimum assistance; Additional time;Assist x1  Stand to Sit: Minimum assistance                             Balance:  Sitting: Intact  Standing: Intact; With support  Standing - Static: Good;Constant support  Standing - Dynamic : Good;Constant support  Ambulation/Gait Training:  Distance (ft): 40 Feet (ft)  Assistive Device: Gait belt;Walker, rolling  Ambulation - Level of Assistance: Minimal assistance;Assist x1        Gait Abnormalities: Antalgic;Decreased step clearance; Step to gait        Base of Support: Widened  Stance: Left decreased  Speed/Rose: Pace decreased (<100 feet/min); Slow  Step Length: Right shortened  Swing Pattern: Left asymmetrical          Activity Tolerance: Good  Please refer to the flowsheet for vital signs taken during this treatment. After treatment patient left in no apparent distress:   Sitting in chair and Call bell within reach    COMMUNICATION/COLLABORATION:   The patients plan of care was discussed with: Registered nurse.      Ricki Flor, PT   Time Calculation: 20 mins

## 2020-04-08 VITALS
DIASTOLIC BLOOD PRESSURE: 78 MMHG | TEMPERATURE: 97.9 F | SYSTOLIC BLOOD PRESSURE: 131 MMHG | BODY MASS INDEX: 32.88 KG/M2 | OXYGEN SATURATION: 97 % | HEART RATE: 82 BPM | RESPIRATION RATE: 16 BRPM | HEIGHT: 70 IN | WEIGHT: 229.7 LBS

## 2020-04-08 LAB
ANION GAP SERPL CALC-SCNC: 5 MMOL/L (ref 5–15)
BASOPHILS # BLD: 0 K/UL (ref 0–0.1)
BASOPHILS NFR BLD: 1 % (ref 0–1)
BUN SERPL-MCNC: 18 MG/DL (ref 6–20)
BUN/CREAT SERPL: 39 (ref 12–20)
CALCIUM SERPL-MCNC: 8.8 MG/DL (ref 8.5–10.1)
CHLORIDE SERPL-SCNC: 108 MMOL/L (ref 97–108)
CO2 SERPL-SCNC: 27 MMOL/L (ref 21–32)
CREAT SERPL-MCNC: 0.46 MG/DL (ref 0.55–1.02)
DIFFERENTIAL METHOD BLD: ABNORMAL
EOSINOPHIL # BLD: 0.2 K/UL (ref 0–0.4)
EOSINOPHIL NFR BLD: 5 % (ref 0–7)
ERYTHROCYTE [DISTWIDTH] IN BLOOD BY AUTOMATED COUNT: 13.6 % (ref 11.5–14.5)
GLUCOSE SERPL-MCNC: 105 MG/DL (ref 65–100)
HCT VFR BLD AUTO: 25 % (ref 35–47)
HGB BLD-MCNC: 7.9 G/DL (ref 11.5–16)
IMM GRANULOCYTES # BLD AUTO: 0 K/UL (ref 0–0.04)
IMM GRANULOCYTES NFR BLD AUTO: 1 % (ref 0–0.5)
LYMPHOCYTES # BLD: 1.2 K/UL (ref 0.8–3.5)
LYMPHOCYTES NFR BLD: 26 % (ref 12–49)
MCH RBC QN AUTO: 30.7 PG (ref 26–34)
MCHC RBC AUTO-ENTMCNC: 31.6 G/DL (ref 30–36.5)
MCV RBC AUTO: 97.3 FL (ref 80–99)
MONOCYTES # BLD: 0.4 K/UL (ref 0–1)
MONOCYTES NFR BLD: 8 % (ref 5–13)
NEUTS SEG # BLD: 2.9 K/UL (ref 1.8–8)
NEUTS SEG NFR BLD: 59 % (ref 32–75)
NRBC # BLD: 0.04 K/UL (ref 0–0.01)
NRBC BLD-RTO: 0.8 PER 100 WBC
PLATELET # BLD AUTO: 101 K/UL (ref 150–400)
PMV BLD AUTO: 11.7 FL (ref 8.9–12.9)
POTASSIUM SERPL-SCNC: 3.5 MMOL/L (ref 3.5–5.1)
RBC # BLD AUTO: 2.57 M/UL (ref 3.8–5.2)
RBC MORPH BLD: ABNORMAL
RBC MORPH BLD: ABNORMAL
SODIUM SERPL-SCNC: 140 MMOL/L (ref 136–145)
WBC # BLD AUTO: 4.7 K/UL (ref 3.6–11)

## 2020-04-08 PROCEDURE — 97535 SELF CARE MNGMENT TRAINING: CPT

## 2020-04-08 PROCEDURE — 74011250637 HC RX REV CODE- 250/637: Performed by: ORTHOPAEDIC SURGERY

## 2020-04-08 PROCEDURE — 74011250636 HC RX REV CODE- 250/636: Performed by: ORTHOPAEDIC SURGERY

## 2020-04-08 PROCEDURE — 85025 COMPLETE CBC W/AUTO DIFF WBC: CPT

## 2020-04-08 PROCEDURE — 97116 GAIT TRAINING THERAPY: CPT

## 2020-04-08 PROCEDURE — 74011250636 HC RX REV CODE- 250/636: Performed by: INTERNAL MEDICINE

## 2020-04-08 PROCEDURE — 36415 COLL VENOUS BLD VENIPUNCTURE: CPT

## 2020-04-08 PROCEDURE — 80048 BASIC METABOLIC PNL TOTAL CA: CPT

## 2020-04-08 PROCEDURE — 97530 THERAPEUTIC ACTIVITIES: CPT

## 2020-04-08 PROCEDURE — 97542 WHEELCHAIR MNGMENT TRAINING: CPT

## 2020-04-08 PROCEDURE — 74011250637 HC RX REV CODE- 250/637: Performed by: INTERNAL MEDICINE

## 2020-04-08 RX ORDER — LANOLIN ALCOHOL/MO/W.PET/CERES
1 CREAM (GRAM) TOPICAL
Status: DISCONTINUED | OUTPATIENT
Start: 2020-04-08 | End: 2020-04-08 | Stop reason: HOSPADM

## 2020-04-08 RX ORDER — POTASSIUM CHLORIDE 750 MG/1
40 TABLET, FILM COATED, EXTENDED RELEASE ORAL
Status: COMPLETED | OUTPATIENT
Start: 2020-04-08 | End: 2020-04-08

## 2020-04-08 RX ORDER — LANOLIN ALCOHOL/MO/W.PET/CERES
325 CREAM (GRAM) TOPICAL
Qty: 30 TAB | Refills: 0 | Status: SHIPPED | OUTPATIENT
Start: 2020-04-09

## 2020-04-08 RX ADMIN — OXYCODONE HYDROCHLORIDE AND ACETAMINOPHEN 0.5 TABLET: 5; 325 TABLET ORAL at 04:18

## 2020-04-08 RX ADMIN — POLYETHYLENE GLYCOL 3350 17 G: 17 POWDER, FOR SOLUTION ORAL at 08:10

## 2020-04-08 RX ADMIN — OXYCODONE HYDROCHLORIDE AND ACETAMINOPHEN 0.5 TABLET: 5; 325 TABLET ORAL at 14:02

## 2020-04-08 RX ADMIN — FERROUS SULFATE TAB 325 MG (65 MG ELEMENTAL FE) 325 MG: 325 (65 FE) TAB at 08:11

## 2020-04-08 RX ADMIN — SENNOSIDES AND DOCUSATE SODIUM 1 TABLET: 8.6; 5 TABLET ORAL at 08:10

## 2020-04-08 RX ADMIN — ACETAMINOPHEN 650 MG: 325 TABLET ORAL at 16:41

## 2020-04-08 RX ADMIN — OXYCODONE HYDROCHLORIDE AND ACETAMINOPHEN 0.5 TABLET: 5; 325 TABLET ORAL at 08:11

## 2020-04-08 RX ADMIN — Medication 10 ML: at 06:41

## 2020-04-08 RX ADMIN — ACETAMINOPHEN 650 MG: 325 TABLET ORAL at 11:26

## 2020-04-08 RX ADMIN — OYSTER SHELL CALCIUM WITH VITAMIN D 1 TABLET: 500; 200 TABLET, FILM COATED ORAL at 08:11

## 2020-04-08 RX ADMIN — ACETAMINOPHEN 650 MG: 325 TABLET ORAL at 00:44

## 2020-04-08 RX ADMIN — IRON SUCROSE 50 MG: 20 INJECTION, SOLUTION INTRAVENOUS at 08:10

## 2020-04-08 RX ADMIN — ENOXAPARIN SODIUM 40 MG: 40 INJECTION SUBCUTANEOUS at 17:11

## 2020-04-08 RX ADMIN — ACETAMINOPHEN 650 MG: 325 TABLET ORAL at 06:41

## 2020-04-08 RX ADMIN — OYSTER SHELL CALCIUM WITH VITAMIN D 1 TABLET: 500; 200 TABLET, FILM COATED ORAL at 11:26

## 2020-04-08 RX ADMIN — CETIRIZINE HYDROCHLORIDE 5 MG: 10 TABLET, FILM COATED ORAL at 08:11

## 2020-04-08 RX ADMIN — POTASSIUM CHLORIDE 40 MEQ: 750 TABLET, FILM COATED, EXTENDED RELEASE ORAL at 08:10

## 2020-04-08 NOTE — PROGRESS NOTES
0825: pt IV infiltrated while giving IV iron. Called Dr. Orlando Miller OK to leave IV out as plan is to D/C today and pt was given oral iron this AM as well.

## 2020-04-08 NOTE — PROGRESS NOTES
Problem: Mobility Impaired (Adult and Pediatric)  Goal: *Acute Goals and Plan of Care (Insert Text)  Description: FUNCTIONAL STATUS PRIOR TO ADMISSION: Patient was independent and active without use of DME.    HOME SUPPORT PRIOR TO ADMISSION: The patient lived with  but did not require assist.    Physical Therapy Goals  Initiated 4/6/2020    1. Patient will move from supine to sit and sit to supine  in bed with modified independence within 4 days. 2. Patient will perform sit to stand with modified independence within 4 days. 3. Patient will ambulate with supervision/set-up for 50 feet with the least restrictive device within 4 days. 4. Patient will ascend/descend 3 stairs with 1 handrail(s) with minimal assistance/contact guard assist within 4 days. 5. Patient will perform hip home exercise program per protocol with independence within 4 days. Outcome: Progressing Towards Goal   PHYSICAL THERAPY TREATMENT  Patient: Robb Lizarraga (35 y.o. female)  Date: 4/8/2020  Diagnosis: Femur fracture, left (HCC) [S72.92XA]  Hip fracture ([address] [S72.009A]   Hip fracture ([facility] [address]  Procedure(s) (LRB):  LEFT FEMORAL INTERTROCHANTERIC NAIL INSERTION (Left) 3 Days Post-Op  Precautions: Fall, WBAT(short distance only)  Chart, physical therapy assessment, plan of care and goals were reviewed. ASSESSMENT  Patient continues with skilled PT services and is progressing towards goals. Patient is now cleared from a mobility standpoint. Pt more confident today and able to perform all transfers with SBA-CGA. Improved ability to weight shift anteriorly to complete stand without physical assist. Ambulated with increased gait speed and compensates with toe walking on the L d/t pt's perceived leg length discrepancy. Overall improved activity tolerance, endurance and strength today. Navigated wheelchair through hallways without difficulty. Completed 4 steps with single railings and CGA x 2. More difficulty descending than ascending. Provided pt with stair training handout and gait belt for husbands review. After returning to room pt's rental w/c arrived and reviewed braking, leg rest placement, folding and braking. Pt feels ready to return home. Current Level of Function Impacting Discharge (mobility/balance): cleared for discharge    Other factors to consider for discharge: awaiting transport home         PLAN :  Patient continues to benefit from skilled intervention to address the above impairments. Continue treatment per established plan of care. to address goals. Recommendation for discharge: (in order for the patient to meet his/her long term goals)  Physical therapy at least 2 days/week in the home     This discharge recommendation:  Has been made in collaboration with the attending provider and/or case management    IF patient discharges home will need the following DME: wheelchair       SUBJECTIVE:   Patient stated I can keep the walker for as long as I need.     OBJECTIVE DATA SUMMARY:   Critical Behavior:  Neurologic State: Alert  Orientation Level: Oriented X4  Cognition: Appropriate decision making  Safety/Judgement: Awareness of environment, Fall prevention, Good awareness of safety precautions, Insight into deficits  Functional Mobility Training:  Bed Mobility:                    Transfers:  Sit to Stand: Stand-by assistance  Stand to Sit: Stand-by assistance                             Balance:  Sitting: Intact  Standing: Intact; With support  Ambulation/Gait Training:  Distance (ft): 60 Feet (ft)(in total)  Assistive Device: Gait belt;Walker, rolling  Ambulation - Level of Assistance: Stand-by assistance        Gait Abnormalities: Antalgic;Decreased step clearance(toe walking on the LLE)        Base of Support: Widened  Stance: Left decreased  Speed/Rose: Pace decreased (<100 feet/min); Slow  Step Length: Right shortened;Left shortened  Swing Pattern: Left asymmetrical                 Stairs:  Number of Stairs Trained: 4  Stairs - Level of Assistance: Contact guard assistance   Rail Use: Right         Activity Tolerance:   Good and requires rest breaks  Please refer to the flowsheet for vital signs taken during this treatment. After treatment patient left in no apparent distress:   Sitting in chair and Call bell within reach    COMMUNICATION/COLLABORATION:   The patients plan of care was discussed with: Registered nurse.      Kellie Gutierrez PT   Time Calculation: 44 mins

## 2020-04-08 NOTE — PROGRESS NOTES
CMS Note  4/8/2020    Patient received and signed their 2nd IMM letter. Patient did have some questions and concerns, CMS did inform the full-time CM. Patient was given a copy for their record.   Arvilla Skiff CMS

## 2020-04-08 NOTE — PROGRESS NOTES
Physical Therapy  4/8/2020    11:25 Two attempts made to see patient this AM. Working with OT earlier and now eating lunch and requesting to wait until afternoon. Will follow up but pt will only be seen once today instead of BID.      Thank Kevin Sandra, PT, DPT

## 2020-04-08 NOTE — PROGRESS NOTES
Problem: Self Care Deficits Care Plan (Adult)  Goal: *Acute Goals and Plan of Care (Insert Text)  Description: FUNCTIONAL STATUS PRIOR TO ADMISSION: Patient was independent and active without use of DME.    HOME SUPPORT: The patient lived with  but did not require assist.    Occupational Therapy Goals  Initiated 4/6/2020     1. Patient will perform lower body dressing using Reacher and Stocking Aid at modified independence level within 7 days. 2. Patient will perform toilet transfers at contact-guard assist level using RW and grab bars prn within 7 days. 3. Patient will perform all aspects of toileting at modified independence level using RW and grab bars prn within 7 days. 4. Patient will demonstrate 3/3 hip precautions without cues within 7 days. Outcome: Progressing Towards Goal   OCCUPATIONAL THERAPY TREATMENT  Patient: Britt Dodson (52 y.o. female)  Date: 4/8/2020  Diagnosis: Femur fracture, left ([facility]) [S72.92XA]  Hip fracture ([address] [S72.009A]   Hip fracture ([address]  Procedure(s) (LRB):  LEFT FEMORAL INTERTROCHANTERIC NAIL INSERTION (Left) 3 Days Post-Op  Precautions: Fall, WBAT(short distance only)  Chart, occupational therapy assessment, plan of care, and goals were reviewed. ASSESSMENT  Patient continues with skilled OT services and is progressing towards goals. Pt ambulated to bathroom, stood at sink to wash her hands. She returned to sit in chair and than ambulated to bedside commode for toileting. BSC set up simulating how far she would walk at home to bathroom. Pt able to perform seated hygiene and than returned to chair. Pt anxious about returning home, reviewed hip precautions. Current Level of Function Impacting Discharge (ADLs): Min assist toilet transfers, contact guard hygiene    Other factors to consider for discharge:          PLAN :  Patient continues to benefit from skilled intervention to address the above impairments. Continue treatment per established plan of care.   to address goals. Recommend with staff: ADL re-training, up to bsc for toileting    Recommend next OT session: Cont toward goals    Recommendation for discharge: (in order for the patient to meet his/her long term goals)  Occupational therapy at least 2 days/week in the home     This discharge recommendation:  Has not yet been discussed the attending provider and/or case management    IF patient discharges home will need the following DME: Pt has DME       SUBJECTIVE:   Patient stated I have double doors at home.     OBJECTIVE DATA SUMMARY:   Cognitive/Behavioral Status:  Neurologic State: Alert  Orientation Level: Oriented X4  Cognition: Appropriate decision making             Functional Mobility and Transfers for ADLs:  Bed Mobility:   Not tested, pt already up to the chair    Transfers:     Functional Transfers  Toilet Transfer : Minimum assistance  Adaptive Equipment: Bedside commode       Balance:  Sitting: Intact  Standing: Intact; With support    ADL Intervention:       Grooming  Washing Hands: Contact guard assistance(standing at sink)                        Toileting  Bladder Hygiene: Contact guard assistance         Therapeutic Exercises:   Educated pt as to UE exercises for increased strength and endurance      Activity Tolerance:   Good  Please refer to the flowsheet for vital signs taken during this treatment. After treatment patient left in no apparent distress:   Sitting in chair    COMMUNICATION/COLLABORATION:   The patients plan of care was discussed with: Occupational therapist, Registered nurse, and Physician.      VINEET Espinoza/L  Time Calculation: 18 mins

## 2020-04-08 NOTE — DISCHARGE INSTRUCTIONS
HOSPITALIST DISCHARGE INSTRUCTIONS  NAME: Jadon Ferrera   :  1950   MRN:  [id number]     Date/Time:  2020 9:08 AM    ADMIT DATE: 2020     DISCHARGE DATE: 2020     ADMITTING DIAGNOSIS:  Hip fracture     DISCHARGE DIAGNOSIS:  As above     MEDICATIONS:     · It is important that you take the medication exactly as they are prescribed. · Keep your medication in the bottles provided by the pharmacist and keep a list of the medication names, dosages, and times to be taken in your wallet. · Do not take other medications without consulting your doctor. Pain Management: per above medications    What to do at Home    Recommended diet:  Regular Diet    Recommended activity: Weight bearing as tolerated     Use ice pack to (L) leg 3 times a day for 20 minutes    May shower 5 days after surgery. May remove dressing 7 days after surgery. If you experience any of the following symptoms then please call your primary care physician or return to the emergency room if you cannot get hold of your doctor:  Fever, chills, nausea, vomiting, diarrhea, change in mentation, falling, bleeding, shortness of breath, chest pain     Follow Up:  Please follow-up with Dr. Ashley Snyder in one week. Please call the following number:   Danuta Julio   Physician   Specialty   Orthopedic Surgery   Danuta Julio   Physician   Provider Summary     Title Resident? Provider type   MD No Physician   Primary Contact Information     Phone Fax E-mail Address   [phone] Not available [place] [address]    [address]    [address] [facility]         Information obtained by :  I understand that if any problems occur once I am at home I am to contact my physician. I understand and acknowledge receipt of the instructions indicated above.                                                                                                                                            Physician's or R.N.'s Signature Date/Time                                                                                                                                              Patient or Representative Signature                                                          Date/Time

## 2020-04-08 NOTE — PROGRESS NOTES
Orthopaedic Progress Note  Post Op day: 3 Days Post-Op    2020 9:41 AM     Patient: Catherine Segal MRN: [id number]  SSN: [id number]    YOB: 1950  Age: 65 y.o. Sex: female      Admit date:  2020  Date of Surgery:  2020   Procedures:  Procedure(s):  LEFT FEMORAL INTERTROCHANTERIC NAIL INSERTION  Admitting Physician:  Brittany Alvarado MD   Surgeon:  Ritu Rios) and Role:     Ingrid Espinoza MD - Primary    Consulting Physician(s): Treatment Team: Attending Provider: Mallika Espinal MD; Consulting Provider: Brandon Edwards MD; Primary Nurse: Isabela Mora; Utilization Review: Patrice Amaya RN; Utilization Review: Janine Mckenna; Care Manager: Miky Donaldson    SUBJECTIVE:     Catherine Segal is a 65 y.o. female is 3 Days Post-Op s/p Procedure(s):  LEFT FEMORAL INTERTROCHANTERIC NAIL INSERTION with an appropriate level of post-operative pain. No complaints of nausea, vomiting, dizziness, lightheadedness, chest pain, or shortness of breath. OBJECTIVE:       Physical Exam:  General: Alert, cooperative, no distress. Respiratory: Respirations unlabored  Neurological:  Neurovascular exam within normal limits. Motor: + DF/PF. Musculoskeletal: Calves soft, supple, non-tender upon palpation. Dressing/Wound:  Clean, dry and intact. No significant erythema or swelling.       Vital Signs:        Patient Vitals for the past 8 hrs:   BP Temp Pulse Resp SpO2   20 0757 148/76 97.7 °F (36.5 °C) 93 16 92 %   20 0325 124/81 97.7 °F (36.5 °C) 78 16 93 %                                          Temp (24hrs), Av.9 °F (36.6 °C), Min:97.3 °F (36.3 °C), Max:98.6 °F (37 °C)      Labs:        Recent Labs     20  0305   HCT 25.0*   HGB 7.9*     Lab Results   Component Value Date/Time    Sodium 140 2020 03:05 AM    Potassium 3.5 2020 03:05 AM    Chloride 108 2020 03:05 AM    CO2 27 2020 03:05 AM    Glucose 105 (H) 2020 03:05 AM    BUN 18 04/08/2020 03:05 AM    Creatinine 0.46 (L) 04/08/2020 03:05 AM    Calcium 8.8 04/08/2020 03:05 AM       PT/OT:        Gait  Base of Support: Widened  Speed/Rose: Pace decreased (<100 feet/min), Slow  Step Length: Right shortened  Swing Pattern: Left asymmetrical  Stance: Left decreased  Gait Abnormalities: Antalgic, Decreased step clearance, Step to gait  Ambulation - Level of Assistance: Minimal assistance, Assist x1  Distance (ft): 35 Feet (ft)  Assistive Device: Gait belt, Walker, rolling  Rail Use: Both  Stairs - Level of Assistance: Minimum assistance, Assist X2  Number of Stairs Trained: 1       Patient mobility  Bed Mobility Training  Rolling: Minimum assistance  Supine to Sit: Minimum assistance  Sit to Supine: Minimum assistance  Scooting: Minimum assistance  Transfer Training  Sit to Stand: Minimum assistance, Assist x1  Stand to Sit: Contact guard assistance  Bed to Chair: Moderate assistance, Assist x1, Additional time      Gait Training  Assistive Device: Gait belt, Walker, rolling  Ambulation - Level of Assistance: Minimal assistance, Assist x1  Distance (ft): 35 Feet (ft)  Stairs - Level of Assistance: Minimum assistance, Assist X2  Number of Stairs Trained: 1  Rail Use: Both   Weight Bearing Status  Right Side Weight Bearing: Full  Left Side Weight Bearing: As tolerated        ASSESSMENT / PLAN:   Principal Problem:    Hip fracture ([address] (4/4/2020)    Active Problems:    Leukocytosis (4/4/2020)      Hyperkalemia (4/4/2020)      Hyperglycemia (4/4/2020)      Class 1 obesity in adult (4/4/2020)         Pain Control: Adequate with oral agents   DVT Prophylaxis: Lovenox daily x 30 days, SCDs    Therapy/ Weight bearing: WBAT   Wound/ incisional issues: C, D & I   Medical concerns: Stable   Disposition: DC to home with PT. Follow up with Dr. Ariana Sanchez next week.       Signed By:  Atlee Hamman, NP    Orthopedic Surgery   [address] [place] [address]

## 2020-04-08 NOTE — PROGRESS NOTES
1711.  Instructed patient on Lovenox injection. Patient performed injection as I instructed her. She performed the injection properly. Questions asked by her & answered by me. Home Lovenox Kit & Lovenox information sheet given to patient. Time changed manually on AVS as to when last dose given & next dose due. Patient verbalized understanding. 1715. Discussed discharge instructions with patient. She verbalized understanding. Copy given. One prescription given. No SL. Discharge via wheelchair (hers) via wheelchair van.

## 2020-04-08 NOTE — PROGRESS NOTES
1335; Bedside shift change report given to Avelina Boeck, RN  (oncoming nurse) by Nael Saini RN (offgoing nurse). Report included the following information SBAR, Kardex and Procedure Summary.

## 2020-04-08 NOTE — PROGRESS NOTES
4/8/2020     12:44 PM  Pt DC noted. [facility] wheelchair transport will provide transport and assistance in the home at 5:00 PM. Pt's wheelchair has been delivered. Pt will be followed by [facility] services, and DC clinicals have been uploaded in [employer]. No additional Dc service needs indicated. Care Management Interventions  PCP Verified by CM: Yes(Gabriela. )  Mode of Transport at Discharge: Other (see comment)(Family)  Transition of Care Consult (CM Consult): Discharge Planning  MyChart Signup: No  Discharge Durable Medical Equipment: No  Physical Therapy Consult: Yes  Occupational Therapy Consult: Yes  Speech Therapy Consult: No  Current Support Network: Lives with Spouse  Confirm Follow Up Transport: Family  The Plan for Transition of Care is Related to the Following Treatment Goals : Femur fracture  The Patient and/or Patient Representative was Provided with a Choice of Provider and Agrees with the Discharge Plan?: (S) Yes  Name of the Patient Representative Who was Provided with a Choice of Provider and Agrees with the Discharge Plan: Self  Freedom of Choice List was Provided with Basic Dialogue that Supports the Patient's Individualized Plan of Care/Goals, Treatment Preferences and Shares the Quality Data Associated with the Providers?: (S) Yes  Discharge Location  Discharge Placement: Home with home health    10:11 AM  RUR:  13%  Risk Level: [x]Low []Moderate []High  Value-based purchasing: [] Yes [x] No  Bundle patient: [x] Yes [] No   Specify:  Ortho Hip Fracture Bundle. Transition of care plan:  1. Awaiting medical clearance and DC order. 2. Home with family assistance and HH through [facility]. Wheelchair will be delivered today through [facility]. 3. Outpatient follow-up. 4. Pt's family to transport.

## 2020-04-09 ENCOUNTER — PATIENT OUTREACH (OUTPATIENT)
Dept: CARDIOLOGY CLINIC | Age: 70
End: 2020-04-09

## 2020-04-09 NOTE — PROGRESS NOTES
Patient contacted regarding recent discharge and COVID-19 risk   Care Transition Nurse/ Ambulatory Care Manager contacted the patient by telephone to perform post discharge assessment. Verified name and  with patient as identifiers. Patient has following risk factors of: recent hospital stay. CTN/ACM reviewed discharge instructions, medical action plan and red flags related to discharge diagnosis. Reviewed and educated them on any new and changed medications related to discharge diagnosis. Advised obtaining a 90-day supply of all daily and as-needed medications. Education provided regarding infection prevention, and signs and symptoms of COVID-19 and when to seek medical attention with patient who verbalized understanding. Discussed exposure protocols and quarantine from [address] you at higher risk for severe illness  and given an opportunity for questions and concerns. The patient agrees to contact the COVID-19 hotline [phone] or PCP office for questions related to their healthcare. CTN/ACM provided contact information for future reference. From CDC: Are you at higher risk for severe illness?  Wash your hands often.  Avoid close contact (6 feet, which is about two arm lengths) with people who are sick.  Put distance between yourself and other people if COVID-19 is spreading in your community.  Clean and disinfect frequently touched surfaces.  Avoid all cruise travel and non-essential air travel.  Call your healthcare professional if you have concerns about COVID-19 and your underlying condition or if you are sick.     For more information on steps you can take to protect yourself, see CDC's How to Protect Yourself      Patient/family/caregiver given information for [employer] and agrees to enroll no    Plan for follow-up call in 14 days based on severity of symptoms and risk factors

## 2020-04-13 NOTE — CDMP QUERY
Patient was admitted with a femur fracture and underwent surgical repair. They 
are noted to have an H&H that dropped from 13.6/40.6 to 7.9/25.0. H&H was 
monitored. Progress notes mentioned, \"Anemia - post surgical blood loss. Suspect appropriate. Defer to ortho-daily CBC; iron sucrose. \" After study, was this patient suspected to have: 
=> Acute blood loss anemia 
=> Chronic blood loss anemia 
=> Other , please specify__________ 
=> Unable to determine The medical record reflects the following: 
  Risk Factors: 65 yr old female admitted with femur fracture and underwent surgical repair. Clinical Indicators: H&H that dropped from 13.6/40.6 to 7.9/25.0. H&H was 
monitored. Progress notes mentioned, \"Anemia - post surgical blood loss. Suspect appropriate. Defer to ortho-daily CBC; iron sucrose\". Treatment: CBC, Ferrous sulfate 325 mg Thank You, Anuja Olivares RN, Clinical  
[phone]

## 2020-04-23 ENCOUNTER — PATIENT OUTREACH (OUTPATIENT)
Dept: CARDIOLOGY CLINIC | Age: 70
End: 2020-04-23

## 2020-04-23 NOTE — PROGRESS NOTES
Patient resolved from Transition of Care episode on 4/23/20. Patient/family has been provided the following resources and education related to COVID-19:                         Signs, symptoms and red flags related to COVID-19            CDC exposure and quarantine guidelines            [facility] exposure contact - [phone]            Contact for their local Department of Health                 Patient currently reports that the following symptoms have improved:  no new symptoms   CTN encouraged her to continue-resume taking acetaminophen on schedule. She has had increased pain- especially with increased activities and therapies but had stopped taking on schedule. Using narcotic at night to help with sleep. Advised she can take up to 4000 mg acetaminophen daily- emphasized that her oxycodone has 325 mg in each tablet and to include that in total daily amount. Talked about other measures- using ice and heat to help prior to therapy and after therapy, etc.    She sees ortho surgeon next week and has [place] in place. No further outreach scheduled with this CTN/ACM. Episode of Care resolved. Patient has this CTN/ACM contact information if future needs arise.

## 2020-04-29 NOTE — DISCHARGE SUMMARY
Physician Discharge Summary     Patient ID:  Catherine Segal  [id number]  30 y.o.  1950    Admit date: 4/4/2020    Discharge date and time: 4/8/2020    Admission Diagnoses: Femur fracture, left ([facility] [address] [S72.92XA]  Hip fracture [facility]) [S72.009A]    Discharge Diagnoses/Hospital Course   Hip fracture ([facility] [address] (4/4/2020) - s/p repair. Lovenox x 28 days for DVT prophylaxis. Evaluated by PT/OT in house. Will d/c to home with home health PT/OT        Leukocytosis (4/4/2020) - suspect acute stress reaction as UA and CXR WNL and resolved       Hyperkalemia - resolved without treatment        Hyperglycemia (4/4/2020) - A1c 5.7. No e/o DM         Class 1 obesity in adult (4/4/2020) - counseled on weight loss       Acute blood loss anemia - post surgical blood loss. Suspect appropriate. Defer to ortho. S/p iron sucrose. PO iron at discharge if able to tolerate     PCP: Huang Izquierdo MD     Consults: Orthopedic Surgery    Discharge Exam:  Visit Vitals  /78 (BP 1 Location: Right arm, BP Patient Position: At rest)   Pulse 82   Temp 97.9 °F (36.6 °C)   Resp 16   Ht 5' 10\" (1.778 m)   Wt 104.2 kg (229 lb 11.2 oz)   SpO2 97%   Breastfeeding No   BMI 32.96 kg/m²     Gen:  Well-developed, well-nourished, in no acute distress  HEENT:  Pink conjunctivae, PERRL, hearing intact to voice, moist mucous membranes  Neck:  Supple, without masses, thyroid non-tender  Resp:  No accessory muscle use, clear breath sounds without wheezes rales or rhonchi  Card:  No murmurs, normal S1, S2 without thrills, bruits or peripheral edema  Abd:  Soft, non-tender, non-distended, normoactive bowel sounds are present  Musc:  No cyanosis or clubbing  Skin:  No rashes or ulcers, skin turgor is good  Neuro:  Cranial nerves 3-12 are grossly intact,  strength is 5/5 bilaterally and dorsi / plantarflexion is 5/5 bilaterally, follows commands appropriately  Psych:  Good insight, oriented to person, place and time, alert  L hip dressing C/D/I. Disposition: home    Patient Instructions:   Discharge Medication List as of 4/8/2020  4:58 PM      START taking these medications    Details   ferrous sulfate 325 mg (65 mg iron) tablet Take 1 Tab by mouth daily (with breakfast). Indications: OVER THE COUNTER, Normal, Disp-30 Tab, R-0      calcium-vitamin D (OYSTER SHELL) 500 mg(1,250mg) -200 unit per tablet Take 1 Tab by mouth three (3) times daily (with meals). , Print, Disp-90 Tab, R-0      enoxaparin (LOVENOX) 40 mg/0.4 mL 0.4 mL by SubCUTAneous route every twenty-four (24) hours for 28 days. , Print, Disp-11.2 mL, R-0         CONTINUE these medications which have CHANGED    Details   oxyCODONE-acetaminophen (PERCOCET) 5-325 mg per tablet Take 0.5 Tabs by mouth every three (3) hours as needed for Pain for up to 3 days. Max Daily Amount: 4 Tabs., Print, Disp-30 Tab, R-0         CONTINUE these medications which have NOT CHANGED    Details   multivitamin, tx-iron-ca-min (THERA-M w/ IRON) 9 mg iron-400 mcg tab tablet Take 1 Tab by mouth daily. , Historical Med      cetirizine (ZyrTEC) 10 mg tablet Take 5 mg by mouth two (2) times a day., Historical Med      docusate sodium (COLACE) 100 mg capsule Take 100 mg by mouth two (2) times daily as needed for Constipation. , Historical Med      OTHER,NON-FORMULARY, Fiber pill daily, Historical Med         STOP taking these medications       aspirin delayed-release 81 mg tablet Comments:   Reason for Stopping:             Activity: See surgical instructions  Diet: Regular Diet  Wound Care: Keep wound clean and dry    Follow-up with Nayan Harrison MD   Please follow-up with orthopedics as advised by orthopedics team   Follow-up Information     Follow up With Specialties Details Why Contact Info    AT [facility], If you have not received a phone call to schedule, call directly within 24 hours of discharge.  [address] [place] [address]. [address]    Nayan Harrison MD Family Practice   [address] [facility]  [phone]            Approximate time spent in patient care on day of discharge: 35 minutes     Signed:  Vannessa Sanders MD  4/28/2020  8:10 PM

## 2022-02-19 ENCOUNTER — HOSPITAL ENCOUNTER (EMERGENCY)
Age: 72
Discharge: HOME | End: 2022-02-19
Attending: EMERGENCY MEDICINE
Payer: MEDICARE

## 2022-02-19 VITALS
DIASTOLIC BLOOD PRESSURE: 65 MMHG | BODY MASS INDEX: 25.77 KG/M2 | RESPIRATION RATE: 16 BRPM | HEART RATE: 64 BPM | WEIGHT: 180 LBS | HEIGHT: 70 IN | SYSTOLIC BLOOD PRESSURE: 129 MMHG | OXYGEN SATURATION: 99 % | TEMPERATURE: 99.1 F

## 2022-02-19 DIAGNOSIS — K59.00 CONSTIPATION, UNSPECIFIED CONSTIPATION TYPE: ICD-10-CM

## 2022-02-19 DIAGNOSIS — K62.89 RECTAL PAIN: Primary | ICD-10-CM

## 2022-02-19 LAB
ALBUMIN SERPL-MCNC: 3.6 G/DL (ref 3.5–5)
ALBUMIN/GLOB SERPL: 1 {RATIO} (ref 1.1–2.2)
ALP SERPL-CCNC: 79 U/L (ref 45–117)
ALT SERPL-CCNC: 20 U/L (ref 12–78)
ANION GAP SERPL CALC-SCNC: 8 MMOL/L (ref 5–15)
AST SERPL-CCNC: 12 U/L (ref 15–37)
BASOPHILS # BLD: 0 K/UL (ref 0–0.1)
BASOPHILS NFR BLD: 0 % (ref 0–1)
BILIRUB SERPL-MCNC: 1.3 MG/DL (ref 0.2–1)
BUN SERPL-MCNC: 19 MG/DL (ref 6–20)
BUN/CREAT SERPL: 30 (ref 12–20)
CALCIUM SERPL-MCNC: 9.5 MG/DL (ref 8.5–10.1)
CHLORIDE SERPL-SCNC: 105 MMOL/L (ref 97–108)
CO2 SERPL-SCNC: 27 MMOL/L (ref 21–32)
COMMENT, HOLDF: NORMAL
CREAT SERPL-MCNC: 0.63 MG/DL (ref 0.55–1.02)
DIFFERENTIAL METHOD BLD: ABNORMAL
EOSINOPHIL # BLD: 0.1 K/UL (ref 0–0.4)
EOSINOPHIL NFR BLD: 1 % (ref 0–7)
ERYTHROCYTE [DISTWIDTH] IN BLOOD BY AUTOMATED COUNT: 12.7 % (ref 11.5–14.5)
GLOBULIN SER CALC-MCNC: 3.6 G/DL (ref 2–4)
GLUCOSE SERPL-MCNC: 125 MG/DL (ref 65–100)
HCT VFR BLD AUTO: 43.2 % (ref 35–47)
HGB BLD-MCNC: 14.7 G/DL (ref 11.5–16)
IMM GRANULOCYTES # BLD AUTO: 0 K/UL (ref 0–0.04)
IMM GRANULOCYTES NFR BLD AUTO: 0 % (ref 0–0.5)
LYMPHOCYTES # BLD: 0.6 K/UL (ref 0.8–3.5)
LYMPHOCYTES NFR BLD: 8 % (ref 12–49)
MCH RBC QN AUTO: 31.4 PG (ref 26–34)
MCHC RBC AUTO-ENTMCNC: 34 G/DL (ref 30–36.5)
MCV RBC AUTO: 92.3 FL (ref 80–99)
MONOCYTES # BLD: 0.7 K/UL (ref 0–1)
MONOCYTES NFR BLD: 9 % (ref 5–13)
NEUTS SEG # BLD: 6.4 K/UL (ref 1.8–8)
NEUTS SEG NFR BLD: 82 % (ref 32–75)
NRBC # BLD: 0 K/UL (ref 0–0.01)
NRBC BLD-RTO: 0 PER 100 WBC
PLATELET # BLD AUTO: 133 K/UL (ref 150–400)
PMV BLD AUTO: 11.1 FL (ref 8.9–12.9)
POTASSIUM SERPL-SCNC: 3.2 MMOL/L (ref 3.5–5.1)
PROT SERPL-MCNC: 7.2 G/DL (ref 6.4–8.2)
RBC # BLD AUTO: 4.68 M/UL (ref 3.8–5.2)
RBC MORPH BLD: ABNORMAL
SAMPLES BEING HELD,HOLD: NORMAL
SODIUM SERPL-SCNC: 140 MMOL/L (ref 136–145)
WBC # BLD AUTO: 7.8 K/UL (ref 3.6–11)

## 2022-02-19 PROCEDURE — 74011250636 HC RX REV CODE- 250/636: Performed by: EMERGENCY MEDICINE

## 2022-02-19 PROCEDURE — 99284 EMERGENCY DEPT VISIT MOD MDM: CPT

## 2022-02-19 PROCEDURE — 96374 THER/PROPH/DIAG INJ IV PUSH: CPT

## 2022-02-19 PROCEDURE — 85025 COMPLETE CBC W/AUTO DIFF WBC: CPT

## 2022-02-19 PROCEDURE — 74011250637 HC RX REV CODE- 250/637: Performed by: EMERGENCY MEDICINE

## 2022-02-19 PROCEDURE — 80053 COMPREHEN METABOLIC PANEL: CPT

## 2022-02-19 PROCEDURE — 96376 TX/PRO/DX INJ SAME DRUG ADON: CPT

## 2022-02-19 PROCEDURE — 36415 COLL VENOUS BLD VENIPUNCTURE: CPT

## 2022-02-19 RX ORDER — POTASSIUM CHLORIDE 750 MG/1
40 TABLET, FILM COATED, EXTENDED RELEASE ORAL
Status: COMPLETED | OUTPATIENT
Start: 2022-02-19 | End: 2022-02-19

## 2022-02-19 RX ORDER — FENTANYL CITRATE 50 UG/ML
50 INJECTION, SOLUTION INTRAMUSCULAR; INTRAVENOUS ONCE
Status: COMPLETED | OUTPATIENT
Start: 2022-02-19 | End: 2022-02-19

## 2022-02-19 RX ORDER — POLYETHYLENE GLYCOL 3350, SODIUM SULFATE ANHYDROUS, SODIUM BICARBONATE, SODIUM CHLORIDE, POTASSIUM CHLORIDE 236; 22.74; 6.74; 5.86; 2.97 G/4L; G/4L; G/4L; G/4L; G/4L
2 POWDER, FOR SOLUTION ORAL
Qty: 2000 ML | Refills: 0 | Status: SHIPPED | OUTPATIENT
Start: 2022-02-19 | End: 2022-02-19

## 2022-02-19 RX ADMIN — POTASSIUM CHLORIDE 40 MEQ: 750 TABLET, EXTENDED RELEASE ORAL at 15:04

## 2022-02-19 RX ADMIN — FENTANYL CITRATE 50 MCG: 50 INJECTION, SOLUTION INTRAMUSCULAR; INTRAVENOUS at 12:17

## 2022-02-19 RX ADMIN — FENTANYL CITRATE 50 MCG: 50 INJECTION, SOLUTION INTRAMUSCULAR; INTRAVENOUS at 15:03

## 2022-02-19 RX ADMIN — SODIUM CHLORIDE 1000 ML: 9 INJECTION, SOLUTION INTRAVENOUS at 12:07

## 2022-02-19 NOTE — DISCHARGE INSTRUCTIONS
Return to the emergency department if you are still not having bowel movements after using the GoLYTELY prep, if you develop abdominal pain, nausea and vomiting, fever, or any other symptoms you find concerning. I recommend he stop taking with calcium supplementation and I have sent the GoLYTELY prep to your pharmacy.

## 2022-02-19 NOTE — ED TRIAGE NOTES
Pt reports difficulty passing bowels x5 days. She took mineral oil and has had constant oozing bowels with bright, rectal bleeding when wiping. Pt reports hemorrhoid pain. Denies abd pain, chills, fevers.

## 2022-03-18 PROBLEM — D72.829 LEUKOCYTOSIS: Status: ACTIVE | Noted: 2020-04-04

## 2022-03-19 PROBLEM — E87.5 HYPERKALEMIA: Status: ACTIVE | Noted: 2020-04-04

## 2022-03-19 PROBLEM — E66.811 CLASS 1 OBESITY IN ADULT: Status: ACTIVE | Noted: 2020-04-04

## 2022-03-19 PROBLEM — R73.9 HYPERGLYCEMIA: Status: ACTIVE | Noted: 2020-04-04

## 2022-03-19 PROBLEM — E66.9 CLASS 1 OBESITY IN ADULT: Status: ACTIVE | Noted: 2020-04-04

## 2022-03-20 PROBLEM — S72.009A HIP FRACTURE (HCC): Status: ACTIVE | Noted: 2020-04-04

## 2023-05-10 RX ORDER — B-COMPLEX WITH VITAMIN C
1 TABLET ORAL
COMMUNITY
Start: 2020-04-07

## 2023-05-10 RX ORDER — CETIRIZINE HYDROCHLORIDE 10 MG/1
TABLET ORAL 2 TIMES DAILY
COMMUNITY

## 2023-05-10 RX ORDER — FERROUS SULFATE 325(65) MG
TABLET ORAL
COMMUNITY
Start: 2020-04-09

## 2023-05-10 RX ORDER — PSEUDOEPHEDRINE HCL 30 MG
TABLET ORAL 2 TIMES DAILY PRN
COMMUNITY

## 2025-06-23 ENCOUNTER — OFFICE VISIT (OUTPATIENT)
Facility: CLINIC | Age: 75
End: 2025-06-23
Payer: MEDICARE

## 2025-06-23 VITALS
SYSTOLIC BLOOD PRESSURE: 136 MMHG | HEART RATE: 67 BPM | TEMPERATURE: 97.6 F | RESPIRATION RATE: 17 BRPM | OXYGEN SATURATION: 98 % | HEIGHT: 68 IN | DIASTOLIC BLOOD PRESSURE: 87 MMHG | BODY MASS INDEX: 23.76 KG/M2 | WEIGHT: 156.8 LBS

## 2025-06-23 DIAGNOSIS — Z01.818 PREOP EXAMINATION: Primary | ICD-10-CM

## 2025-06-23 DIAGNOSIS — R41.3 MEMORY LOSS: ICD-10-CM

## 2025-06-23 PROCEDURE — G8420 CALC BMI NORM PARAMETERS: HCPCS | Performed by: FAMILY MEDICINE

## 2025-06-23 PROCEDURE — 4004F PT TOBACCO SCREEN RCVD TLK: CPT | Performed by: FAMILY MEDICINE

## 2025-06-23 PROCEDURE — 99214 OFFICE O/P EST MOD 30 MIN: CPT | Performed by: FAMILY MEDICINE

## 2025-06-23 PROCEDURE — G8427 DOCREV CUR MEDS BY ELIG CLIN: HCPCS | Performed by: FAMILY MEDICINE

## 2025-06-23 PROCEDURE — G2211 COMPLEX E/M VISIT ADD ON: HCPCS | Performed by: FAMILY MEDICINE

## 2025-06-23 PROCEDURE — 1159F MED LIST DOCD IN RCRD: CPT | Performed by: FAMILY MEDICINE

## 2025-06-23 PROCEDURE — 1123F ACP DISCUSS/DSCN MKR DOCD: CPT | Performed by: FAMILY MEDICINE

## 2025-06-23 PROCEDURE — G8400 PT W/DXA NO RESULTS DOC: HCPCS | Performed by: FAMILY MEDICINE

## 2025-06-23 PROCEDURE — 1090F PRES/ABSN URINE INCON ASSESS: CPT | Performed by: FAMILY MEDICINE

## 2025-06-23 PROCEDURE — 1126F AMNT PAIN NOTED NONE PRSNT: CPT | Performed by: FAMILY MEDICINE

## 2025-06-23 NOTE — PROGRESS NOTES
Heidi Putnam  75 y.o. female  1950  MRN:[id number]  [facility]  Progress Note     Encounter Date: 2025      Preoperative Evaluation    Date of Exam: 2025    Heidi Putnam is a 75 y.o. female (:1950) who presents for preoperative evaluation.     Procedure/Surgery: Cataract OS with IOL    Date of Procedure/Surgery: 2025    Surgeon: Dr. Neville    Hospital/Surgical Facility: [facility]    Primary Physician: Reynold Fowler MD    Latex Allergy: No    Recent use of: No recent use of aspirin (ASA), NSAIDS or steroids    Tetanus up to date: tetanus status unknown to the patient    Anesthesia Complications: no reported complications    History of abnormal bleeding : None    History of Blood Transfusions: no    Health Care Directive or Living Will: yes    Functional Capacity: is able to climb a flight of stairs without chest pain or severe SOB      Allergies- reviewed:   No Known Allergies      Medications- reviewed:   Current Outpatient Medications   Medication Sig    Calcium Carbonate-Vitamin D (OYSTER SHELL CALCIUM/D) 500-5 MG-MCG TABS Take 1 tablet by mouth 3 times daily (with meals)    cetirizine (ZYRTEC) 10 MG tablet Take by mouth 2 times daily    docusate (COLACE, DULCOLAX) 100 MG CAPS Take by mouth 2 times daily as needed    ferrous sulfate (IRON 325) 325 (65 Fe) MG tablet Take by mouth daily (with breakfast)     No current facility-administered medications for this visit.         Past Medical History- reviewed:  No past medical history on file.      Past Surgical History- reviewed:   No past surgical history on file.      Social History- reviewed:  Social History     Socioeconomic History    Marital status:      Spouse name: Not on file    Number of children: Not on file    Years of education: Not on file    Highest education level: Not on file   Occupational History    Not on file   Tobacco Use    Smoking status: Never    Smokeless tobacco: Never

## 2025-06-24 ENCOUNTER — RESULTS FOLLOW-UP (OUTPATIENT)
Facility: CLINIC | Age: 75
End: 2025-06-24

## 2025-06-24 LAB
ALBUMIN SERPL-MCNC: 4.4 G/DL (ref 3.5–5)
ALBUMIN/GLOB SERPL: 1.4 (ref 1.1–2.2)
ALP SERPL-CCNC: 87 U/L (ref 45–117)
ALT SERPL-CCNC: 20 U/L (ref 12–78)
ANION GAP SERPL CALC-SCNC: 8 MMOL/L (ref 2–12)
AST SERPL-CCNC: 11 U/L (ref 15–37)
BASOPHILS # BLD: 0.04 K/UL (ref 0–0.1)
BASOPHILS NFR BLD: 1 % (ref 0–1)
BILIRUB DIRECT SERPL-MCNC: 0.3 MG/DL (ref 0–0.2)
BILIRUB SERPL-MCNC: 1.2 MG/DL (ref 0.2–1)
BUN SERPL-MCNC: 22 MG/DL (ref 6–20)
BUN/CREAT SERPL: 31 (ref 12–20)
CALCIUM SERPL-MCNC: 10.2 MG/DL (ref 8.5–10.1)
CHLORIDE SERPL-SCNC: 104 MMOL/L (ref 97–108)
CO2 SERPL-SCNC: 27 MMOL/L (ref 21–32)
CREAT SERPL-MCNC: 0.7 MG/DL (ref 0.55–1.02)
DIFFERENTIAL METHOD BLD: ABNORMAL
EOSINOPHIL # BLD: 0.09 K/UL (ref 0–0.4)
EOSINOPHIL NFR BLD: 2.2 % (ref 0–7)
ERYTHROCYTE [DISTWIDTH] IN BLOOD BY AUTOMATED COUNT: 13.4 % (ref 11.5–14.5)
GLOBULIN SER CALC-MCNC: 3.1 G/DL (ref 2–4)
GLUCOSE SERPL-MCNC: 95 MG/DL (ref 65–100)
HCT VFR BLD AUTO: 48.3 % (ref 35–47)
HGB BLD-MCNC: 14.9 G/DL (ref 11.5–16)
IMM GRANULOCYTES # BLD AUTO: 0.02 K/UL (ref 0–0.04)
IMM GRANULOCYTES NFR BLD AUTO: 0.5 % (ref 0–0.5)
LYMPHOCYTES # BLD: 1.03 K/UL (ref 0.8–3.5)
LYMPHOCYTES NFR BLD: 25.2 % (ref 12–49)
MCH RBC QN AUTO: 31 PG (ref 26–34)
MCHC RBC AUTO-ENTMCNC: 30.8 G/DL (ref 30–36.5)
MCV RBC AUTO: 100.6 FL (ref 80–99)
MONOCYTES # BLD: 0.31 K/UL (ref 0–1)
MONOCYTES NFR BLD: 7.6 % (ref 5–13)
NEUTS SEG # BLD: 2.59 K/UL (ref 1.8–8)
NEUTS SEG NFR BLD: 63.5 % (ref 32–75)
NRBC # BLD: 0 K/UL (ref 0–0.01)
NRBC BLD-RTO: 0 PER 100 WBC
PLATELET # BLD AUTO: 161 K/UL (ref 150–400)
PMV BLD AUTO: 12.5 FL (ref 8.9–12.9)
POTASSIUM SERPL-SCNC: 3.9 MMOL/L (ref 3.5–5.1)
PROT SERPL-MCNC: 7.5 G/DL (ref 6.4–8.2)
RBC # BLD AUTO: 4.8 M/UL (ref 3.8–5.2)
SODIUM SERPL-SCNC: 139 MMOL/L (ref 136–145)
TSH SERPL DL<=0.05 MIU/L-ACNC: 1.82 UIU/ML (ref 0.36–3.74)
VIT B12 SERPL-MCNC: 275 PG/ML (ref 193–986)
WBC # BLD AUTO: 4.1 K/UL (ref 3.6–11)

## 2025-06-25 LAB — ANA SER QL: NEGATIVE

## 2025-06-30 ENCOUNTER — TELEPHONE (OUTPATIENT)
Age: 75
End: 2025-06-30

## 2025-07-22 ENCOUNTER — TELEPHONE (OUTPATIENT)
Facility: CLINIC | Age: 75
End: 2025-07-22

## 2025-07-22 SDOH — ECONOMIC STABILITY: FOOD INSECURITY: WITHIN THE PAST 12 MONTHS, YOU WORRIED THAT YOUR FOOD WOULD RUN OUT BEFORE YOU GOT MONEY TO BUY MORE.: NEVER TRUE

## 2025-07-22 SDOH — HEALTH STABILITY: PHYSICAL HEALTH: ON AVERAGE, HOW MANY DAYS PER WEEK DO YOU ENGAGE IN MODERATE TO STRENUOUS EXERCISE (LIKE A BRISK WALK)?: 0 DAYS

## 2025-07-22 SDOH — ECONOMIC STABILITY: INCOME INSECURITY: IN THE LAST 12 MONTHS, WAS THERE A TIME WHEN YOU WERE NOT ABLE TO PAY THE MORTGAGE OR RENT ON TIME?: NO

## 2025-07-22 SDOH — ECONOMIC STABILITY: FOOD INSECURITY: WITHIN THE PAST 12 MONTHS, THE FOOD YOU BOUGHT JUST DIDN'T LAST AND YOU DIDN'T HAVE MONEY TO GET MORE.: NEVER TRUE

## 2025-07-22 SDOH — HEALTH STABILITY: PHYSICAL HEALTH: ON AVERAGE, HOW MANY MINUTES DO YOU ENGAGE IN EXERCISE AT THIS LEVEL?: 0 MIN

## 2025-07-22 SDOH — ECONOMIC STABILITY: TRANSPORTATION INSECURITY
IN THE PAST 12 MONTHS, HAS THE LACK OF TRANSPORTATION KEPT YOU FROM MEDICAL APPOINTMENTS OR FROM GETTING MEDICATIONS?: NO

## 2025-07-22 SDOH — ECONOMIC STABILITY: TRANSPORTATION INSECURITY
IN THE PAST 12 MONTHS, HAS LACK OF TRANSPORTATION KEPT YOU FROM MEETINGS, WORK, OR FROM GETTING THINGS NEEDED FOR DAILY LIVING?: NO

## 2025-07-22 ASSESSMENT — PATIENT HEALTH QUESTIONNAIRE - PHQ9
SUM OF ALL RESPONSES TO PHQ QUESTIONS 1-9: 0
2. FEELING DOWN, DEPRESSED OR HOPELESS: NOT AT ALL
SUM OF ALL RESPONSES TO PHQ QUESTIONS 1-9: 0
1. LITTLE INTEREST OR PLEASURE IN DOING THINGS: NOT AT ALL

## 2025-07-22 ASSESSMENT — LIFESTYLE VARIABLES
HOW MANY STANDARD DRINKS CONTAINING ALCOHOL DO YOU HAVE ON A TYPICAL DAY: 1 OR 2
HOW OFTEN DO YOU HAVE A DRINK CONTAINING ALCOHOL: 3
HOW OFTEN DO YOU HAVE A DRINK CONTAINING ALCOHOL: 2-4 TIMES A MONTH
HOW OFTEN DO YOU HAVE SIX OR MORE DRINKS ON ONE OCCASION: 1
HOW MANY STANDARD DRINKS CONTAINING ALCOHOL DO YOU HAVE ON A TYPICAL DAY: 1

## 2025-07-23 ENCOUNTER — OFFICE VISIT (OUTPATIENT)
Facility: CLINIC | Age: 75
End: 2025-07-23
Payer: MEDICARE

## 2025-07-23 VITALS
BODY MASS INDEX: 24.41 KG/M2 | OXYGEN SATURATION: 97 % | SYSTOLIC BLOOD PRESSURE: 163 MMHG | WEIGHT: 161.1 LBS | DIASTOLIC BLOOD PRESSURE: 72 MMHG | HEIGHT: 68 IN | HEART RATE: 72 BPM | RESPIRATION RATE: 17 BRPM | TEMPERATURE: 97.6 F

## 2025-07-23 DIAGNOSIS — H54.7 VISUAL LOSS: ICD-10-CM

## 2025-07-23 DIAGNOSIS — R41.3 MEMORY LOSS: ICD-10-CM

## 2025-07-23 DIAGNOSIS — F32.1 CURRENT MODERATE EPISODE OF MAJOR DEPRESSIVE DISORDER, UNSPECIFIED WHETHER RECURRENT (HCC): ICD-10-CM

## 2025-07-23 DIAGNOSIS — Z00.00 MEDICARE ANNUAL WELLNESS VISIT, SUBSEQUENT: Primary | ICD-10-CM

## 2025-07-23 PROCEDURE — G0439 PPPS, SUBSEQ VISIT: HCPCS | Performed by: FAMILY MEDICINE

## 2025-07-23 PROCEDURE — 1159F MED LIST DOCD IN RCRD: CPT | Performed by: FAMILY MEDICINE

## 2025-07-23 PROCEDURE — 4004F PT TOBACCO SCREEN RCVD TLK: CPT | Performed by: FAMILY MEDICINE

## 2025-07-23 PROCEDURE — G8427 DOCREV CUR MEDS BY ELIG CLIN: HCPCS | Performed by: FAMILY MEDICINE

## 2025-07-23 PROCEDURE — 1123F ACP DISCUSS/DSCN MKR DOCD: CPT | Performed by: FAMILY MEDICINE

## 2025-07-23 PROCEDURE — G8420 CALC BMI NORM PARAMETERS: HCPCS | Performed by: FAMILY MEDICINE

## 2025-07-23 PROCEDURE — 99214 OFFICE O/P EST MOD 30 MIN: CPT | Performed by: FAMILY MEDICINE

## 2025-07-23 PROCEDURE — 1090F PRES/ABSN URINE INCON ASSESS: CPT | Performed by: FAMILY MEDICINE

## 2025-07-23 PROCEDURE — G8400 PT W/DXA NO RESULTS DOC: HCPCS | Performed by: FAMILY MEDICINE

## 2025-07-23 PROCEDURE — 1126F AMNT PAIN NOTED NONE PRSNT: CPT | Performed by: FAMILY MEDICINE

## 2025-07-23 RX ORDER — DULOXETIN HYDROCHLORIDE 30 MG/1
30 CAPSULE, DELAYED RELEASE ORAL DAILY
Qty: 30 CAPSULE | Refills: 5 | Status: SHIPPED | OUTPATIENT
Start: 2025-07-23

## 2025-07-23 NOTE — PROGRESS NOTES
Heidi Putnam  75 y.o. female  1950  MRN:[id number]  [facility]  Progress Note     Encounter Date: 7/23/2025    Assessment and Plan:       ICD-10-CM    1. Medicare annual wellness visit, subsequent  Z00.00       2. Memory loss  R41.3 MRI BRAIN WO CONTRAST      3. Current moderate episode of major depressive disorder, unspecified whether recurrent (HCC)  F32.1 DULoxetine (CYMBALTA) 30 MG extended release capsule      4. Visual loss  H54.7         1. Medicare annual wellness visit, subsequent  updated  2. Memory loss  Labs normal  MMSE today was 19/30  Has some issues even concentrating enough to even start tasks on MMSE  Recent, relatively rapid onset.  Will get MRI  Neuro to see her in September-Dr. Gottlieb.  -     MRI BRAIN WO CONTRAST; Future  3. Current moderate episode of major depressive disorder, unspecified whether recurrent (HCC)  Patient very upset about her memory and vision loss.  Tearful during evaluation.  Some of her sx may be depression so will give trial of cymbalta  FU one month  -     DULoxetine (CYMBALTA) 30 MG extended release capsule; Take 1 capsule by mouth daily, Disp-30 capsule, R-5Normal  4. Visual loss  No better with cataract extraction.  Very fearful of falls.       I have discussed the diagnosis with the patient and the intended plan as seen in the above orders.  she has expressed understanding.  The patient has received an after-visit summary and questions were answered concerning future plans.  I have discussed medication side effects and warnings with the patient as well.    Electronically Signed: Reynold Fowler MD    Current Medications after this visit     Current Outpatient Medications   Medication Sig    DULoxetine (CYMBALTA) 30 MG extended release capsule Take 1 capsule by mouth daily    Calcium Carbonate-Vitamin D (OYSTER SHELL CALCIUM/D) 500-5 MG-MCG TABS Take 1 tablet by mouth 3 times daily (with meals)    cetirizine (ZYRTEC) 10 MG tablet Take

## 2025-08-01 ENCOUNTER — HOSPITAL ENCOUNTER (OUTPATIENT)
Facility: HOSPITAL | Age: 75
Discharge: HOME | End: 2025-08-01
Attending: FAMILY MEDICINE
Payer: MEDICARE

## 2025-08-01 DIAGNOSIS — R41.3 MEMORY LOSS: ICD-10-CM

## 2025-08-01 PROCEDURE — 70551 MRI BRAIN STEM W/O DYE: CPT

## 2025-08-14 DIAGNOSIS — F32.1 CURRENT MODERATE EPISODE OF MAJOR DEPRESSIVE DISORDER, UNSPECIFIED WHETHER RECURRENT (HCC): ICD-10-CM

## 2025-08-14 RX ORDER — DULOXETIN HYDROCHLORIDE 30 MG/1
CAPSULE, DELAYED RELEASE ORAL DAILY
Qty: 90 CAPSULE | Refills: 1 | Status: SHIPPED | OUTPATIENT
Start: 2025-08-14

## 2025-08-26 ENCOUNTER — HOSPITAL ENCOUNTER (EMERGENCY)
Facility: HOSPITAL | Age: 75
Discharge: HOME | End: 2025-08-26
Attending: EMERGENCY MEDICINE
Payer: MEDICARE

## 2025-08-26 ENCOUNTER — APPOINTMENT (OUTPATIENT)
Facility: HOSPITAL | Age: 75
End: 2025-08-26
Payer: MEDICARE

## 2025-08-26 VITALS
SYSTOLIC BLOOD PRESSURE: 144 MMHG | TEMPERATURE: 98.2 F | HEART RATE: 60 BPM | OXYGEN SATURATION: 98 % | DIASTOLIC BLOOD PRESSURE: 78 MMHG | HEIGHT: 68 IN | RESPIRATION RATE: 21 BRPM | BODY MASS INDEX: 24.5 KG/M2

## 2025-08-26 DIAGNOSIS — R41.82 ALTERED MENTAL STATUS, UNSPECIFIED ALTERED MENTAL STATUS TYPE: Primary | ICD-10-CM

## 2025-08-26 DIAGNOSIS — R26.2 DIFFICULTY WALKING: ICD-10-CM

## 2025-08-26 LAB
ALBUMIN SERPL-MCNC: 3.8 G/DL (ref 3.5–5.2)
ALBUMIN/GLOB SERPL: 1.3 (ref 1.1–2.2)
ALP SERPL-CCNC: 79 U/L (ref 35–104)
ALT SERPL-CCNC: 10 U/L (ref 10–35)
AMMONIA PLAS-SCNC: 10 UMOL/L (ref 11–51)
AMPHET UR QL SCN: NEGATIVE
ANION GAP SERPL CALC-SCNC: 12 MMOL/L (ref 2–14)
APPEARANCE UR: CLEAR
AST SERPL-CCNC: 16 U/L (ref 10–35)
BACTERIA URNS QL MICRO: NEGATIVE /HPF
BARBITURATES UR QL SCN: NEGATIVE
BASOPHILS # BLD: 0.03 K/UL (ref 0–0.1)
BASOPHILS NFR BLD: 0.7 % (ref 0–1)
BENZODIAZ UR QL: NEGATIVE
BILIRUB SERPL-MCNC: 0.9 MG/DL (ref 0–1.2)
BILIRUB UR QL: NEGATIVE
BUN SERPL-MCNC: 13 MG/DL (ref 8–23)
BUN/CREAT SERPL: 21 (ref 12–20)
CALCIUM SERPL-MCNC: 9.8 MG/DL (ref 8.8–10.2)
CANNABINOIDS UR QL SCN: NEGATIVE
CHLORIDE SERPL-SCNC: 100 MMOL/L (ref 98–107)
CO2 SERPL-SCNC: 27 MMOL/L (ref 20–29)
COCAINE UR QL SCN: NEGATIVE
COLOR UR: NORMAL
COMMENT:: NORMAL
CREAT SERPL-MCNC: 0.61 MG/DL (ref 0.6–1)
DIFFERENTIAL METHOD BLD: ABNORMAL
EOSINOPHIL # BLD: 0.15 K/UL (ref 0–0.4)
EOSINOPHIL NFR BLD: 3.4 % (ref 0–7)
EPITH CASTS URNS QL MICRO: NORMAL /LPF
ERYTHROCYTE [DISTWIDTH] IN BLOOD BY AUTOMATED COUNT: 12.9 % (ref 11.5–14.5)
ETHANOL SERPL-MCNC: <11 MG/DL (ref 0–0.08)
FENTANYL: NEGATIVE
GLOBULIN SER CALC-MCNC: 3 G/DL (ref 2–4)
GLUCOSE BLD STRIP.AUTO-MCNC: 95 MG/DL (ref 65–117)
GLUCOSE SERPL-MCNC: 93 MG/DL (ref 65–100)
GLUCOSE UR STRIP.AUTO-MCNC: NEGATIVE MG/DL
HCT VFR BLD AUTO: 42.8 % (ref 35–47)
HGB BLD-MCNC: 14.7 G/DL (ref 11.5–16)
HGB UR QL STRIP: NEGATIVE
HYALINE CASTS URNS QL MICRO: NORMAL /LPF (ref 0–2)
IMM GRANULOCYTES # BLD AUTO: 0.01 K/UL (ref 0–0.04)
IMM GRANULOCYTES NFR BLD AUTO: 0.2 % (ref 0–0.5)
KETONES UR QL STRIP.AUTO: NEGATIVE MG/DL
LEUKOCYTE ESTERASE UR QL STRIP.AUTO: NEGATIVE
LYMPHOCYTES # BLD: 1.24 K/UL (ref 0.8–3.5)
LYMPHOCYTES NFR BLD: 28.1 % (ref 12–49)
Lab: NORMAL
MAGNESIUM SERPL-MCNC: 2.4 MG/DL (ref 1.6–2.4)
MCH RBC QN AUTO: 30.4 PG (ref 26–34)
MCHC RBC AUTO-ENTMCNC: 34.3 G/DL (ref 30–36.5)
MCV RBC AUTO: 88.6 FL (ref 80–99)
METHADONE UR QL: NEGATIVE
MONOCYTES # BLD: 0.41 K/UL (ref 0–1)
MONOCYTES NFR BLD: 9.3 % (ref 5–13)
NEUTS SEG # BLD: 2.58 K/UL (ref 1.8–8)
NEUTS SEG NFR BLD: 58.3 % (ref 32–75)
NITRITE UR QL STRIP.AUTO: NEGATIVE
NRBC # BLD: 0 K/UL (ref 0–0.01)
NRBC BLD-RTO: 0 PER 100 WBC
OPIATES UR QL: NEGATIVE
PCP UR QL: NEGATIVE
PH UR STRIP: 7 (ref 5–8)
PLATELET # BLD AUTO: 141 K/UL (ref 150–400)
PMV BLD AUTO: 12 FL (ref 8.9–12.9)
POTASSIUM SERPL-SCNC: 3.2 MMOL/L (ref 3.5–5.1)
PROT SERPL-MCNC: 6.8 G/DL (ref 6.4–8.3)
PROT UR STRIP-MCNC: NEGATIVE MG/DL
RBC # BLD AUTO: 4.83 M/UL (ref 3.8–5.2)
RBC #/AREA URNS HPF: NORMAL /HPF (ref 0–5)
SALICYLATES SERPL-MCNC: <0.5 MG/DL (ref 3–10)
SERVICE CMNT-IMP: NORMAL
SODIUM SERPL-SCNC: 139 MMOL/L (ref 136–145)
SP GR UR REFRACTOMETRY: 1.01 (ref 1–1.03)
SPECIMEN HOLD: NORMAL
SPECIMEN HOLD: NORMAL
TSH, 3RD GENERATION: 3.88 UIU/ML (ref 0.27–4.2)
UROBILINOGEN UR QL STRIP.AUTO: 1 EU/DL (ref 0.2–1)
WBC # BLD AUTO: 4.4 K/UL (ref 3.6–11)
WBC URNS QL MICRO: NORMAL /HPF (ref 0–4)

## 2025-08-26 PROCEDURE — 82077 ASSAY SPEC XCP UR&BREATH IA: CPT

## 2025-08-26 PROCEDURE — 36415 COLL VENOUS BLD VENIPUNCTURE: CPT

## 2025-08-26 PROCEDURE — 80179 DRUG ASSAY SALICYLATE: CPT

## 2025-08-26 PROCEDURE — 70450 CT HEAD/BRAIN W/O DYE: CPT

## 2025-08-26 PROCEDURE — 83735 ASSAY OF MAGNESIUM: CPT

## 2025-08-26 PROCEDURE — 99284 EMERGENCY DEPT VISIT MOD MDM: CPT

## 2025-08-26 PROCEDURE — 84443 ASSAY THYROID STIM HORMONE: CPT

## 2025-08-26 PROCEDURE — 85025 COMPLETE CBC W/AUTO DIFF WBC: CPT

## 2025-08-26 PROCEDURE — 80053 COMPREHEN METABOLIC PANEL: CPT

## 2025-08-26 PROCEDURE — 81001 URINALYSIS AUTO W/SCOPE: CPT

## 2025-08-26 PROCEDURE — 82140 ASSAY OF AMMONIA: CPT

## 2025-08-26 PROCEDURE — 82962 GLUCOSE BLOOD TEST: CPT

## 2025-08-26 ASSESSMENT — PAIN SCALES - GENERAL: PAINLEVEL_OUTOF10: 0

## 2025-08-26 ASSESSMENT — PAIN - FUNCTIONAL ASSESSMENT: PAIN_FUNCTIONAL_ASSESSMENT: 0-10

## (undated) DEVICE — REM POLYHESIVE ADULT PATIENT RETURN ELECTRODE: Brand: VALLEYLAB

## (undated) DEVICE — INFECTION CONTROL KIT SYS

## (undated) DEVICE — SHEET, DRAPE, SPLIT, STERILE: Brand: MEDLINE

## (undated) DEVICE — TELFA ADHESIVE ISLAND DRESSING: Brand: TELFA

## (undated) DEVICE — SUTURE ABSORBABLE BRAIDED 2-0 CT-1 27 IN UD VICRYL J259H

## (undated) DEVICE — BANDAGE COHESIVE L5YDXW6IN M TAN CO FLX

## (undated) DEVICE — GOWN,PREVENTION PLUS,XLN/XL,ST,24/CS: Brand: MEDLINE

## (undated) DEVICE — SOLUTION IV 1000ML 0.9% SOD CHL

## (undated) DEVICE — STERILE POLYISOPRENE POWDER-FREE SURGICAL GLOVES: Brand: PROTEXIS

## (undated) DEVICE — ROCKER SWITCH PENCIL BLADE ELECTRODE, HOLSTER: Brand: EDGE

## (undated) DEVICE — SUTURE MCRYL SZ 3-0 L27IN ABSRB UD L24MM PS-1 3/8 CIR PRIM Y936H

## (undated) DEVICE — ROD RMR L950MM DIA2.5MM W/ EXTN BALL TIP

## (undated) DEVICE — COVER LT HNDL PLAS RIG 1 PER PK

## (undated) DEVICE — 3.2MM GUIDE WIRE 400MM

## (undated) DEVICE — PACK,BASIC,SIRUS,V: Brand: MEDLINE

## (undated) DEVICE — 3M™ STERI-DRAPE™ U-DRAPE 1015: Brand: STERI-DRAPE™

## (undated) DEVICE — 4.2MM THREE-FLUTED DRILL BIT QC/330MM/100MM CALIBRATION

## (undated) DEVICE — SHOULDER SUSPENSION KIT 6 PER BOX

## (undated) DEVICE — CANISTER, RIGID, 3000CC: Brand: MEDLINE INDUSTRIES, INC.

## (undated) DEVICE — DRAPE XR C ARM 41X74IN LF --

## (undated) DEVICE — SURGICAL PROCEDURE PACK BASIN MAJ SET CUST NO CAUT

## (undated) DEVICE — DRAPE,U/ SHT,SPLIT,PLAS,STERIL: Brand: MEDLINE

## (undated) DEVICE — DRAPE,REIN 53X77,STERILE: Brand: MEDLINE

## (undated) DEVICE — SPONGE GZ W4XL4IN COT 12 PLY TYP VII WVN C FLD DSGN

## (undated) DEVICE — STERILE POLYISOPRENE POWDER-FREE SURGICAL GLOVES WITH EMOLLIENT COATING: Brand: PROTEXIS

## (undated) DEVICE — (D)PREP SKN CHLRAPRP APPL 26ML -- CONVERT TO ITEM 371833

## (undated) DEVICE — SUTURE VCRL SZ 0 L36IN ABSRB UD L36MM CT-1 1/2 CIR J946H